# Patient Record
Sex: MALE | Race: OTHER | URBAN - METROPOLITAN AREA
[De-identification: names, ages, dates, MRNs, and addresses within clinical notes are randomized per-mention and may not be internally consistent; named-entity substitution may affect disease eponyms.]

---

## 2020-12-15 ENCOUNTER — EMERGENCY (EMERGENCY)
Facility: HOSPITAL | Age: 45
LOS: 0 days | Discharge: HOME | End: 2020-12-15
Attending: EMERGENCY MEDICINE | Admitting: EMERGENCY MEDICINE
Payer: MEDICAID

## 2020-12-15 VITALS
HEART RATE: 72 BPM | OXYGEN SATURATION: 100 % | SYSTOLIC BLOOD PRESSURE: 126 MMHG | RESPIRATION RATE: 18 BRPM | DIASTOLIC BLOOD PRESSURE: 76 MMHG | TEMPERATURE: 98 F

## 2020-12-15 DIAGNOSIS — Z87.19 PERSONAL HISTORY OF OTHER DISEASES OF THE DIGESTIVE SYSTEM: ICD-10-CM

## 2020-12-15 DIAGNOSIS — R10.13 EPIGASTRIC PAIN: ICD-10-CM

## 2020-12-15 DIAGNOSIS — K80.20 CALCULUS OF GALLBLADDER WITHOUT CHOLECYSTITIS WITHOUT OBSTRUCTION: ICD-10-CM

## 2020-12-15 LAB
ALBUMIN SERPL ELPH-MCNC: 4.6 G/DL — SIGNIFICANT CHANGE UP (ref 3.5–5.2)
ALP SERPL-CCNC: 102 U/L — SIGNIFICANT CHANGE UP (ref 30–115)
ALT FLD-CCNC: 32 U/L — SIGNIFICANT CHANGE UP (ref 0–41)
ANION GAP SERPL CALC-SCNC: 12 MMOL/L — SIGNIFICANT CHANGE UP (ref 7–14)
AST SERPL-CCNC: 19 U/L — SIGNIFICANT CHANGE UP (ref 0–41)
BASOPHILS # BLD AUTO: 0.03 K/UL — SIGNIFICANT CHANGE UP (ref 0–0.2)
BASOPHILS NFR BLD AUTO: 0.4 % — SIGNIFICANT CHANGE UP (ref 0–1)
BILIRUB SERPL-MCNC: 0.5 MG/DL — SIGNIFICANT CHANGE UP (ref 0.2–1.2)
BUN SERPL-MCNC: 20 MG/DL — SIGNIFICANT CHANGE UP (ref 10–20)
CALCIUM SERPL-MCNC: 9.3 MG/DL — SIGNIFICANT CHANGE UP (ref 8.5–10.1)
CHLORIDE SERPL-SCNC: 99 MMOL/L — SIGNIFICANT CHANGE UP (ref 98–110)
CO2 SERPL-SCNC: 24 MMOL/L — SIGNIFICANT CHANGE UP (ref 17–32)
CREAT SERPL-MCNC: 0.8 MG/DL — SIGNIFICANT CHANGE UP (ref 0.7–1.5)
EOSINOPHIL # BLD AUTO: 0.05 K/UL — SIGNIFICANT CHANGE UP (ref 0–0.7)
EOSINOPHIL NFR BLD AUTO: 0.6 % — SIGNIFICANT CHANGE UP (ref 0–8)
GLUCOSE SERPL-MCNC: 118 MG/DL — HIGH (ref 70–99)
HCT VFR BLD CALC: 45 % — SIGNIFICANT CHANGE UP (ref 42–52)
HGB BLD-MCNC: 15 G/DL — SIGNIFICANT CHANGE UP (ref 14–18)
IMM GRANULOCYTES NFR BLD AUTO: 0.4 % — HIGH (ref 0.1–0.3)
LACTATE SERPL-SCNC: 1.8 MMOL/L — SIGNIFICANT CHANGE UP (ref 0.7–2)
LIDOCAIN IGE QN: 35 U/L — SIGNIFICANT CHANGE UP (ref 7–60)
LYMPHOCYTES # BLD AUTO: 1.28 K/UL — SIGNIFICANT CHANGE UP (ref 1.2–3.4)
LYMPHOCYTES # BLD AUTO: 16 % — LOW (ref 20.5–51.1)
MCHC RBC-ENTMCNC: 30.4 PG — SIGNIFICANT CHANGE UP (ref 27–31)
MCHC RBC-ENTMCNC: 33.3 G/DL — SIGNIFICANT CHANGE UP (ref 32–37)
MCV RBC AUTO: 91.1 FL — SIGNIFICANT CHANGE UP (ref 80–94)
MONOCYTES # BLD AUTO: 0.27 K/UL — SIGNIFICANT CHANGE UP (ref 0.1–0.6)
MONOCYTES NFR BLD AUTO: 3.4 % — SIGNIFICANT CHANGE UP (ref 1.7–9.3)
NEUTROPHILS # BLD AUTO: 6.32 K/UL — SIGNIFICANT CHANGE UP (ref 1.4–6.5)
NEUTROPHILS NFR BLD AUTO: 79.2 % — HIGH (ref 42.2–75.2)
NRBC # BLD: 0 /100 WBCS — SIGNIFICANT CHANGE UP (ref 0–0)
PLATELET # BLD AUTO: 221 K/UL — SIGNIFICANT CHANGE UP (ref 130–400)
POTASSIUM SERPL-MCNC: 4.1 MMOL/L — SIGNIFICANT CHANGE UP (ref 3.5–5)
POTASSIUM SERPL-SCNC: 4.1 MMOL/L — SIGNIFICANT CHANGE UP (ref 3.5–5)
PROT SERPL-MCNC: 7.5 G/DL — SIGNIFICANT CHANGE UP (ref 6–8)
RBC # BLD: 4.94 M/UL — SIGNIFICANT CHANGE UP (ref 4.7–6.1)
RBC # FLD: 12.5 % — SIGNIFICANT CHANGE UP (ref 11.5–14.5)
SODIUM SERPL-SCNC: 135 MMOL/L — SIGNIFICANT CHANGE UP (ref 135–146)
TROPONIN T SERPL-MCNC: <0.01 NG/ML — SIGNIFICANT CHANGE UP
WBC # BLD: 7.98 K/UL — SIGNIFICANT CHANGE UP (ref 4.8–10.8)
WBC # FLD AUTO: 7.98 K/UL — SIGNIFICANT CHANGE UP (ref 4.8–10.8)

## 2020-12-15 PROCEDURE — 99283 EMERGENCY DEPT VISIT LOW MDM: CPT

## 2020-12-15 PROCEDURE — 76705 ECHO EXAM OF ABDOMEN: CPT | Mod: 26

## 2020-12-15 PROCEDURE — 93010 ELECTROCARDIOGRAM REPORT: CPT

## 2020-12-15 PROCEDURE — 99285 EMERGENCY DEPT VISIT HI MDM: CPT | Mod: 25

## 2020-12-15 PROCEDURE — 74177 CT ABD & PELVIS W/CONTRAST: CPT | Mod: 26

## 2020-12-15 PROCEDURE — 36000 PLACE NEEDLE IN VEIN: CPT

## 2020-12-15 RX ORDER — FAMOTIDINE 10 MG/ML
20 INJECTION INTRAVENOUS ONCE
Refills: 0 | Status: COMPLETED | OUTPATIENT
Start: 2020-12-15 | End: 2020-12-15

## 2020-12-15 RX ORDER — DIPHENHYDRAMINE HYDROCHLORIDE AND LIDOCAINE HYDROCHLORIDE AND ALUMINUM HYDROXIDE AND MAGNESIUM HYDRO
30 KIT ONCE
Refills: 0 | Status: COMPLETED | OUTPATIENT
Start: 2020-12-15 | End: 2020-12-15

## 2020-12-15 RX ORDER — SODIUM CHLORIDE 9 MG/ML
1000 INJECTION INTRAMUSCULAR; INTRAVENOUS; SUBCUTANEOUS ONCE
Refills: 0 | Status: COMPLETED | OUTPATIENT
Start: 2020-12-15 | End: 2020-12-15

## 2020-12-15 RX ADMIN — FAMOTIDINE 20 MILLIGRAM(S): 10 INJECTION INTRAVENOUS at 12:06

## 2020-12-15 RX ADMIN — DIPHENHYDRAMINE HYDROCHLORIDE AND LIDOCAINE HYDROCHLORIDE AND ALUMINUM HYDROXIDE AND MAGNESIUM HYDRO 30 MILLILITER(S): KIT at 12:06

## 2020-12-15 RX ADMIN — SODIUM CHLORIDE 1000 MILLILITER(S): 9 INJECTION INTRAMUSCULAR; INTRAVENOUS; SUBCUTANEOUS at 12:06

## 2020-12-15 NOTE — ED PROVIDER NOTE - OBJECTIVE STATEMENT
45y M w/ PMH of gastritis presenting with epigastric pain that started at 12am this morning. Occurred gradually. No alleviating/worsening symptoms. Denies fever, chills, cough, runny nose, sore throat, n/v/d, or numbness/tingling.

## 2020-12-15 NOTE — ED PROVIDER NOTE - ATTENDING CONTRIBUTION TO CARE
44 y/o male with hx of gallstones, c/o epigastric pain after eating. Moderate in intensity. Non-radiating. No aggravating or alleviating factors. No fever. No vomiting. No chest pain, no SOB.  O/E: Constitutional: Non-toxic in appearance. Eyes: No pallor, no jaundice. Neck: Neck supple, no meningeal signs. Respiratory: Lungs CTA and equal b/l. Cardio: S1 S2 regular, no murmur. ABD: ABD soft, + epigastric tenderness, no RUQ tenderness, negative Wolf's, no guarding, no rebound. Extremities: No pedal edema, no calf tenderness. Skin: No skin rash. Neuro: No neurologic deficits.   ED Bedside U/S --> + gallstones, no cholecystitis.  Imp: Ddx includes biliary colic, gastritis, pancreatitis.  A/P: Analgesics, check labs, lipase, will re-assess.

## 2020-12-15 NOTE — ED PROVIDER NOTE - PATIENT PORTAL LINK FT
You can access the FollowMyHealth Patient Portal offered by [facility] by registering at the following website: [email or web address]. By joining [email or web address]’s FollowMyHealth portal, you will also be able to view your health information using other applications (apps) compatible with our system. You can access the FollowMyHealth Patient Portal offered by [facility] by registering at the following website: [email or web address]. By joining [employer]’s FollowMyHealth portal, you will also be able to view your health information using other applications (apps) compatible with our system.

## 2020-12-15 NOTE — ED PROVIDER NOTE - CARE PROVIDER_API CALL
Morris Baptiste  GASTROENTEROLOGY  [address] [place] [address]  Phone: [phone]  Fax: [phone]  Follow Up Time: 1-3 Days   Morris Baptiste  GASTROENTEROLOGY  [address] [place] [address]  Phone: [phone]  Fax: [phone]  Follow Up Time: 1-3 Days    Rayshawn Frye  SURGERY  [address] [place] [address], [address] [place] [address]  Phone: [phone]  Fax: [phone]  Follow Up Time: 1-3 Days

## 2020-12-15 NOTE — ED PROCEDURE NOTE - ATTENDING CONTRIBUTION TO CARE
I was present for and supervised the key and critical aspects of the procedures performed during the care of the patient. This ends my involvement in the patients care.

## 2020-12-15 NOTE — ED PROVIDER NOTE - CLINICAL SUMMARY MEDICAL DECISION MAKING FREE TEXT BOX
Pt s/o to my by Dr. Yo - 46 y/o male in ER with c/o epigastric pain and N x 1 day.  Labs ok, lipase and lft's wnl.  RUQ sono - contracted GB, CT abd - questionable pericholecystic stranding.  pt seen and eval by surgery, pain much improved, per surgery, pt can be d/c'd to f/u with surg and GI as outpt  pt told to return to ER for worsening pain, fever, vomiting, or any other new/concerning symptoms. pt understands and agrees with plan.

## 2020-12-15 NOTE — ED PROVIDER NOTE - NSFOLLOWUPCLINICS_GEN_ALL_ED_FT
[facility]  Medicine  [address], [place]   Phone: [phone]  Fax:   Follow Up Time: 1-3 Days     [facility]  Medicine  [address] [place], [place]   Phone: [phone]  Fax:   Follow Up Time: 1-3 Days

## 2020-12-15 NOTE — CONSULT NOTE ADULT - ASSESSMENT
A/P:  FLORENCE VIGIL is a 45y year old Male presenting to the ED for abdominal pain. Hx of gallstones. RUQ us showing gallstones within a contracted gallbladder. No LFT derangements. Upon exam in ED patient states relief of abdominal pain.       Plan:  No acute surgical intervention  Patient with biliary colic, resolved  May be discharged with outpatient followup      Plan discussed with Dr. Frye    Thank you for this consultation and allowing us to participate in your patient's care.

## 2020-12-15 NOTE — ED PROVIDER NOTE - PHYSICAL EXAMINATION
CONSTITUTIONAL: Well-developed; well-nourished; in no acute distress.   SKIN: warm, dry  HEAD: Normocephalic; atraumatic.  EYES: PERRL, EOMI, no conjunctival erythema  ENT: No nasal discharge; airway clear.  NECK: Supple; non tender.  CARD: S1, S2 normal; no murmurs, gallops, or rubs. Regular rate and rhythm.   RESP: No wheezes, rales or rhonchi.  ABD: soft mild tenderness to palpation of the epigastric region.   EXT: Normal ROM.  No clubbing, cyanosis or edema.   LYMPH: No acute cervical adenopathy.  NEURO: Alert, oriented, grossly unremarkable  PSYCH: Cooperative, appropriate.

## 2020-12-15 NOTE — ED PROVIDER NOTE - PROGRESS NOTE DETAILS
Pt cont to complain of abd pain. States med did not relieve symptoms. Repeat exam ttp to epigastric region. Will obtain CT and reassess. s/o Dr. Talavera Pt feels much improved. States abd pain resolved. No repeat tenderness. Discussed with Dr. Berger, surg resident. Will evaluate pt. Discussed with Dr. Berger, surg resident. Recommends outpatient f/u.

## 2020-12-15 NOTE — ED PROVIDER NOTE - CARE PROVIDERS DIRECT ADDRESSES
[email or web address]

## 2020-12-15 NOTE — CONSULT NOTE ADULT - SUBJECTIVE AND OBJECTIVE BOX
FLORENCE VIGIL [id number]  45y Male  HPI: 46 yo male with past medical hx of gastritis, no surgical history, presents to the ED for 1d of abdomina pain. His pain started around midnight last night, described as sharp, located at his epigastrium, radiating to his RUQ. He ate fried pork 2 hours prior to onset of symptoms. He states similar pain in the past, approximately 4 years ago when he was diagnosed with gallstones. He follows with GI in [place] ( Dr. Clare Hu) for gastritis, last scoped 1 month ago. EGD showing gastritis, C-scope showing diverticulosis. Denies fevers, chills, chest pain, palpitations, nausea, vomiting, diarrhea, constipation.     PAST MEDICAL & SURGICAL HISTORY:  Gastritis      MEDICATIONS  (STANDING):  None     Allergies  No Known Allergies        REVIEW OF SYSTEMS  [x] A ten-point review of systems was otherwise negative except as noted.  [ ] Due to altered mental status/intubation, subjective information were not able to be obtained from the patient. History was obtained, to the extent possible, from review of the chart and collateral sources of information.      Vital Signs Last 24 Hrs  T(C): 36.7 (15 Dec 2020 11:05), Max: 36.7 (15 Dec 2020 11:05)  T(F): 98 (15 Dec 2020 11:05), Max: 98 (15 Dec 2020 11:05)  HR: 72 (15 Dec 2020 11:05) (72 - 72)  BP: 126/76 (15 Dec 2020 11:05) (126/76 - 126/76)  BP(mean): --  RR: 18 (15 Dec 2020 11:05) (18 - 18)  SpO2: 100% (15 Dec 2020 11:05) (100% - 100%)    PHYSICAL EXAM:  GENERAL: NAD, well-appearing  CHEST/LUNG: Clear to auscultation bilaterally  HEART: Regular rate and rhythm  ABDOMEN: Soft, mild epigastric tenderness, no RUQ tenderness, negative wolf sign, no guarding or rebound  EXTREMITIES:  No clubbing, cyanosis, or edema      LABS:  Labs:  CAPILLARY BLOOD GLUCOSE                        15.0   7.98  )-----------( 221      ( 15 Dec 2020 11:45 )             45.0       Auto Neutrophil %: 79.2 % (12-15-20 @ 11:45)  Auto Immature Granulocyte %: 0.4 % (12-15-20 @ 11:45)    12-15    135  |  99  |  20  ----------------------------<  118<H>  4.1   |  24  |  0.8      Calcium, Total Serum: 9.3 mg/dL (12-15-20 @ 11:45)      LFTs:             7.5  | 0.5  | 19       ------------------[102     ( 15 Dec 2020 11:45 )  4.6  | x    | 32          Lipase:35     Amylase:x         Lactate, Blood: 1.8 mmol/L (12-15-20 @ 11:45)      Coags:    CARDIAC MARKERS ( 15 Dec 2020 11:45 )  x     / <0.01 ng/mL / x     / x     / x              RADIOLOGY & ADDITIONAL STUDIES:  < from: US Abdomen Limited (12.15.20 @ 14:35) >    Liver: Within normal limits.  Bile ducts: Normal caliber. Common bile duct measures 4 mm.  Gallbladder: Contracted gallbladder with gallstones. Negative sonographic Wolf's sign.  Pancreas: Obscured by overlying bowel gas.  Right kidney: 10.2 cm. No hydronephrosis.  Ascites: None.  IVC: Visualized portions are within normal limits.    IMPRESSION:    Contracted gallbladder with gallstones.      < end of copied text >  < from: CT Abdomen and Pelvis w/ IV Cont (12.15.20 @ 15:57) >  IMPRESSION:  Questionable gallbladder pericholecystic fat stranding. Consider further evaluation with right upper quadrant abdominal ultrasound.      < end of copied text >

## 2020-12-15 NOTE — CONSULT NOTE ADULT - ATTENDING COMMENTS
I examined the patietn and discussed my plan with the resident  the patient has biliary colic and no evidence for acute cholecystitis.  he can go home and f/u in office to discuss elective surgery

## 2020-12-15 NOTE — ED PROVIDER NOTE - CARE PLAN
Principal Discharge DX:	Abdominal pain   Principal Discharge DX:	Abdominal pain  Secondary Diagnosis:	Gallstones

## 2020-12-29 PROBLEM — Z00.00 ENCOUNTER FOR PREVENTIVE HEALTH EXAMINATION: Status: ACTIVE | Noted: 2020-12-29

## 2021-05-26 ENCOUNTER — EMERGENCY (EMERGENCY)
Facility: HOSPITAL | Age: 46
LOS: 0 days | Discharge: HOME | End: 2021-05-26
Attending: STUDENT IN AN ORGANIZED HEALTH CARE EDUCATION/TRAINING PROGRAM | Admitting: STUDENT IN AN ORGANIZED HEALTH CARE EDUCATION/TRAINING PROGRAM
Payer: MEDICAID

## 2021-05-26 VITALS
OXYGEN SATURATION: 98 % | HEART RATE: 62 BPM | WEIGHT: 169.98 LBS | RESPIRATION RATE: 18 BRPM | TEMPERATURE: 96 F | DIASTOLIC BLOOD PRESSURE: 72 MMHG | SYSTOLIC BLOOD PRESSURE: 155 MMHG

## 2021-05-26 VITALS
OXYGEN SATURATION: 99 % | RESPIRATION RATE: 17 BRPM | HEART RATE: 60 BPM | TEMPERATURE: 97 F | DIASTOLIC BLOOD PRESSURE: 67 MMHG | SYSTOLIC BLOOD PRESSURE: 141 MMHG

## 2021-05-26 DIAGNOSIS — K82.4 CHOLESTEROLOSIS OF GALLBLADDER: ICD-10-CM

## 2021-05-26 DIAGNOSIS — R10.9 UNSPECIFIED ABDOMINAL PAIN: ICD-10-CM

## 2021-05-26 DIAGNOSIS — K80.20 CALCULUS OF GALLBLADDER WITHOUT CHOLECYSTITIS WITHOUT OBSTRUCTION: ICD-10-CM

## 2021-05-26 LAB
ALBUMIN SERPL ELPH-MCNC: 4.5 G/DL — SIGNIFICANT CHANGE UP (ref 3.5–5.2)
ALP SERPL-CCNC: 95 U/L — SIGNIFICANT CHANGE UP (ref 30–115)
ALT FLD-CCNC: 23 U/L — SIGNIFICANT CHANGE UP (ref 0–41)
ANION GAP SERPL CALC-SCNC: 9 MMOL/L — SIGNIFICANT CHANGE UP (ref 7–14)
APPEARANCE UR: CLEAR — SIGNIFICANT CHANGE UP
AST SERPL-CCNC: 18 U/L — SIGNIFICANT CHANGE UP (ref 0–41)
BASOPHILS # BLD AUTO: 0.03 K/UL — SIGNIFICANT CHANGE UP (ref 0–0.2)
BASOPHILS NFR BLD AUTO: 0.6 % — SIGNIFICANT CHANGE UP (ref 0–1)
BILIRUB DIRECT SERPL-MCNC: <0.2 MG/DL — SIGNIFICANT CHANGE UP (ref 0–0.2)
BILIRUB INDIRECT FLD-MCNC: >0.3 MG/DL — SIGNIFICANT CHANGE UP (ref 0.2–1.2)
BILIRUB SERPL-MCNC: 0.5 MG/DL — SIGNIFICANT CHANGE UP (ref 0.2–1.2)
BILIRUB UR-MCNC: NEGATIVE — SIGNIFICANT CHANGE UP
BUN SERPL-MCNC: 20 MG/DL — SIGNIFICANT CHANGE UP (ref 10–20)
CALCIUM SERPL-MCNC: 9 MG/DL — SIGNIFICANT CHANGE UP (ref 8.5–10.1)
CHLORIDE SERPL-SCNC: 104 MMOL/L — SIGNIFICANT CHANGE UP (ref 98–110)
CO2 SERPL-SCNC: 25 MMOL/L — SIGNIFICANT CHANGE UP (ref 17–32)
COLOR SPEC: SIGNIFICANT CHANGE UP
CREAT SERPL-MCNC: 0.8 MG/DL — SIGNIFICANT CHANGE UP (ref 0.7–1.5)
DIFF PNL FLD: NEGATIVE — SIGNIFICANT CHANGE UP
EOSINOPHIL # BLD AUTO: 0.2 K/UL — SIGNIFICANT CHANGE UP (ref 0–0.7)
EOSINOPHIL NFR BLD AUTO: 4 % — SIGNIFICANT CHANGE UP (ref 0–8)
GLUCOSE SERPL-MCNC: 118 MG/DL — HIGH (ref 70–99)
GLUCOSE UR QL: NEGATIVE — SIGNIFICANT CHANGE UP
HCT VFR BLD CALC: 40.8 % — LOW (ref 42–52)
HGB BLD-MCNC: 14 G/DL — SIGNIFICANT CHANGE UP (ref 14–18)
IMM GRANULOCYTES NFR BLD AUTO: 0.2 % — SIGNIFICANT CHANGE UP (ref 0.1–0.3)
KETONES UR-MCNC: NEGATIVE — SIGNIFICANT CHANGE UP
LACTATE SERPL-SCNC: 1.6 MMOL/L — SIGNIFICANT CHANGE UP (ref 0.7–2)
LEUKOCYTE ESTERASE UR-ACNC: NEGATIVE — SIGNIFICANT CHANGE UP
LIDOCAIN IGE QN: 25 U/L — SIGNIFICANT CHANGE UP (ref 7–60)
LYMPHOCYTES # BLD AUTO: 1.56 K/UL — SIGNIFICANT CHANGE UP (ref 1.2–3.4)
LYMPHOCYTES # BLD AUTO: 30.8 % — SIGNIFICANT CHANGE UP (ref 20.5–51.1)
MCHC RBC-ENTMCNC: 31.2 PG — HIGH (ref 27–31)
MCHC RBC-ENTMCNC: 34.3 G/DL — SIGNIFICANT CHANGE UP (ref 32–37)
MCV RBC AUTO: 90.9 FL — SIGNIFICANT CHANGE UP (ref 80–94)
MONOCYTES # BLD AUTO: 0.34 K/UL — SIGNIFICANT CHANGE UP (ref 0.1–0.6)
MONOCYTES NFR BLD AUTO: 6.7 % — SIGNIFICANT CHANGE UP (ref 1.7–9.3)
NEUTROPHILS # BLD AUTO: 2.92 K/UL — SIGNIFICANT CHANGE UP (ref 1.4–6.5)
NEUTROPHILS NFR BLD AUTO: 57.7 % — SIGNIFICANT CHANGE UP (ref 42.2–75.2)
NITRITE UR-MCNC: NEGATIVE — SIGNIFICANT CHANGE UP
NRBC # BLD: 0 /100 WBCS — SIGNIFICANT CHANGE UP (ref 0–0)
PH UR: 7.5 — SIGNIFICANT CHANGE UP (ref 5–8)
PLATELET # BLD AUTO: 195 K/UL — SIGNIFICANT CHANGE UP (ref 130–400)
POTASSIUM SERPL-MCNC: 4.1 MMOL/L — SIGNIFICANT CHANGE UP (ref 3.5–5)
POTASSIUM SERPL-SCNC: 4.1 MMOL/L — SIGNIFICANT CHANGE UP (ref 3.5–5)
PROT SERPL-MCNC: 6.9 G/DL — SIGNIFICANT CHANGE UP (ref 6–8)
PROT UR-MCNC: NEGATIVE — SIGNIFICANT CHANGE UP
RBC # BLD: 4.49 M/UL — LOW (ref 4.7–6.1)
RBC # FLD: 12.1 % — SIGNIFICANT CHANGE UP (ref 11.5–14.5)
SODIUM SERPL-SCNC: 138 MMOL/L — SIGNIFICANT CHANGE UP (ref 135–146)
SP GR SPEC: 1.04 — HIGH (ref 1.01–1.03)
UROBILINOGEN FLD QL: SIGNIFICANT CHANGE UP
WBC # BLD: 5.06 K/UL — SIGNIFICANT CHANGE UP (ref 4.8–10.8)
WBC # FLD AUTO: 5.06 K/UL — SIGNIFICANT CHANGE UP (ref 4.8–10.8)

## 2021-05-26 PROCEDURE — 76705 ECHO EXAM OF ABDOMEN: CPT | Mod: 26

## 2021-05-26 PROCEDURE — 74177 CT ABD & PELVIS W/CONTRAST: CPT | Mod: 26,MA

## 2021-05-26 PROCEDURE — 99285 EMERGENCY DEPT VISIT HI MDM: CPT

## 2021-05-26 RX ORDER — KETOROLAC TROMETHAMINE 30 MG/ML
15 SYRINGE (ML) INJECTION ONCE
Refills: 0 | Status: DISCONTINUED | OUTPATIENT
Start: 2021-05-26 | End: 2021-05-26

## 2021-05-26 RX ORDER — SODIUM CHLORIDE 9 MG/ML
1000 INJECTION, SOLUTION INTRAVENOUS ONCE
Refills: 0 | Status: COMPLETED | OUTPATIENT
Start: 2021-05-26 | End: 2021-05-26

## 2021-05-26 RX ORDER — METOCLOPRAMIDE HCL 10 MG
10 TABLET ORAL ONCE
Refills: 0 | Status: COMPLETED | OUTPATIENT
Start: 2021-05-26 | End: 2021-05-26

## 2021-05-26 RX ADMIN — Medication 15 MILLIGRAM(S): at 08:37

## 2021-05-26 RX ADMIN — SODIUM CHLORIDE 1000 MILLILITER(S): 9 INJECTION, SOLUTION INTRAVENOUS at 08:10

## 2021-05-26 RX ADMIN — Medication 10 MILLIGRAM(S): at 10:20

## 2021-05-26 NOTE — ED ADULT NURSE NOTE - OBJECTIVE STATEMENT
Pt c/o right sided flank pain since last night radiating to RLQ. Pt has hx of gallstones in 12/2020, never followed up outpatient with surgeon. Denies fevers, chills, cough, cp, sob, n/v/d, urinary symptoms.

## 2021-05-26 NOTE — ED ADULT NURSE NOTE - CHIEF COMPLAINT QUOTE
Patient with hx kidney stones c/o right flank pain/ generalized abdominal pain since last night. Denies Dysuria and blood in urine

## 2021-05-26 NOTE — ED PROVIDER NOTE - PATIENT PORTAL LINK FT
You can access the FollowMyHealth Patient Portal offered by [facility] by registering at the following website: [email or web address]. By joining [employer]’s FollowMyHealth portal, you will also be able to view your health information using other applications (apps) compatible with our system.

## 2021-05-26 NOTE — ED ADULT TRIAGE NOTE - CHIEF COMPLAINT QUOTE
Patient c/o right lower back pain since last night Denies any recent trauma or heavy lifting Patient with hx kidney stones c/o right flank pain/ generalized abdominal pain since last night. Denies Dysuria and blood in urine

## 2021-05-26 NOTE — ED PROVIDER NOTE - CLINICAL SUMMARY MEDICAL DECISION MAKING FREE TEXT BOX
46M with pmh gallstones evaluated in the ED 12/15/21, seen by Dr. Frye, did not f/u outpatient who presents with R flank pain similar to prior associated with eating. Labs wnl, imaging reviewed. Patient has adenomyomatosis, benign polyp of gallbladder and gallstones. Pt given analgesia and reports improvement of Sx. Pt told to f/u with surgery for outpatient lap henry as desired. I have fully discussed the medical management and delivery of care with the patient. I have discussed any available labs, imaging and treatment options with the patient. All Questions answered at the bedside and printed copies of all results provided and recommended to review with PCP. Patient confirms understanding and has been given detailed return precautions. Patient instructed to return to the ED should symptoms persist or worsen. Patient has demonstrated capacity and has verbalized understanding. Patient is well appearing upon discharge, ambulatory with a steady gait.

## 2021-05-26 NOTE — ED PROVIDER NOTE - NSFOLLOWUPINSTRUCTIONS_ED_ALL_ED_FT
Colelitiasis    La colelitiasis es kim forma de enfermedad de la vesícula biliar en la que se oriana cálculos biliares en la vesícula biliar. La vesícula biliar es un órgano que almacena bilis. La bilis se produce en el hígado y ayuda a digerir las grasas. Los cálculos biliares comienzan mary pequeños kia y lentamente se convierten en piedras. Es posible que no causen síntomas hasta que la vesícula biliar se apriete (contraiga) y un cálculo biliar esté bloqueando el conducto (ataque de la vesícula biliar), lo que puede causar dolor. La colelitiasis también se conoce mary cálculos biliares.    Cholelithiasis    Cholelithiasis is a form of gallbladder disease in which gallstones form in the gallbladder. The gallbladder is an organ that stores bile. Bile is made in the liver, and it helps to digest fats. Gallstones begin as small crystals and slowly grow into stones. They may cause no symptoms until the gallbladder tightens (contracts) and a gallstone is blocking the duct (gallbladder attack), which can cause pain. Cholelithiasis is also referred to as gallstones.    ImageThere are two main types of gallstones:    Cholesterol stones. These are made of hardened cholesterol and are usually yellow-green in color. They are the most common type of gallstone. Cholesterol is a white, waxy, fat-like substance that is made in the liver.  Pigment stones. These are dark in color and are made of a red-yellow substance that forms when hemoglobin from red blood cells breaks down (bilirubin).    What are the causes?  This condition may be caused by an imbalance in the substances that bile is made of. This can happen if the bile:    Has too much bilirubin.  Has too much cholesterol.  Does not have enough bile salts. These salts help the body absorb and digest fats.    In some cases, this condition can also be caused by the gallbladder not emptying completely or often enough.    What increases the risk?  The following factors may make you more likely to develop this condition:    Being female.  Having multiple pregnancies. Health care providers sometimes advise removing diseased gallbladders before future pregnancies.  Eating a diet that is heavy in fried foods, fat, and refined carbohydrates, like white bread and white rice.  Being obese.  Being older than age 40.  Prolonged use of medicines that contain female hormones (estrogen).  Having diabetes mellitus.  Rapidly losing weight.  Having a family history of gallstones.  Being of  or [place] descent.  Having an intestinal disease such as Crohn disease.  Having metabolic syndrome.  Having cirrhosis.  Having severe types of anemia such as sickle cell anemia.    What are the signs or symptoms?  In most cases, there are no symptoms. These are known as silent gallstones. If a gallstone blocks the bile ducts, it can cause a gallbladder attack. The main symptom of a gallbladder attack is sudden pain in the upper right abdomen. The pain usually comes at night or after eating a large meal. The pain can last for one or several hours and can spread to the right shoulder or chest.    If the bile duct is blocked for more than a few hours, it can cause infection or inflammation of the gallbladder, liver, or pancreas, which may cause:    Nausea.  Vomiting.  Abdominal pain that lasts for 5 hours or more.  Fever or chills.  Yellowing of the skin or the whites of the eyes (jaundice).  Dark urine.  Light-colored stools.    How is this diagnosed?  This condition may be diagnosed based on:    A physical exam.  Your medical history.  An ultrasound of your gallbladder.  CT scan.  MRI.  Blood tests to check for signs of infection or inflammation.  A scan of your gallbladder and bile ducts (biliary system) using nonharmful radioactive material and special cameras that can see the radioactive material (cholescintigram). This test checks to see how your gallbladder contracts and whether bile ducts are blocked.  Inserting a small tube with a camera on the end (endoscope) through your mouth to inspect bile ducts and check for blockages (endoscopic retrograde cholangiopancreatogram).    How is this treated?  Treatment for gallstones depends on the severity of the condition. Silent gallstones do not need treatment. If the gallstones cause a gallbladder attack or other symptoms, treatment may be required. Options for treatment include:    Surgery to remove the gallbladder (cholecystectomy). This is the most common treatment.  Medicines to dissolve gallstones. These are most effective at treating small gallstones. You may need to take medicines for up to 6–12 months.  Shock wave treatment (extracorporeal biliary lithotripsy). In this treatment, an ultrasound machine sends shock waves to the gallbladder to break gallstones into smaller pieces. These pieces can then be passed into the intestines or be dissolved by medicine. This is rarely used.  Removing gallstones through endoscopic retrograde cholangiopancreatogram. A small basket can be attached to the endoscope and used to capture and remove gallstones.    Follow these instructions at home:  Take over-the-counter and prescription medicines only as told by your health care provider.  Maintain a healthy weight and follow a healthy diet. This includes:    Reducing fatty foods, such as fried food.  Reducing refined carbohydrates, like white bread and white rice.  Increasing fiber. Aim for foods like almonds, fruit, and beans.    Keep all follow-up visits as told by your health care provider. This is important.  Contact a health care provider if:  You think you have had a gallbladder attack.  You have been diagnosed with silent gallstones and you develop abdominal pain or indigestion.  Get help right away if:  You have pain from a gallbladder attack that lasts for more than 2 hours.  You have abdominal pain that lasts for more than 5 hours.  You have a fever or chills.  You have persistent nausea and vomiting.  You develop jaundice.  You have dark urine or light-colored stools.  Summary  Cholelithiasis (also called gallstones) is a form of gallbladder disease in which gallstones form in the gallbladder.  This condition is caused by an imbalance in the substances that make up bile. This can happen if the bile has too much cholesterol, too much bilirubin, or not enough bile salts.  You are more likely to develop this condition if you are female, pregnant, using medicines with estrogen, obese, older than age 40, or have a family history of gallstones. You may also develop gallstones if you have diabetes, an intestinal disease, cirrhosis, or metabolic syndrome.  Treatment for gallstones depends on the severity of the condition. Silent gallstones do not need treatment.  If gallstones cause a gallbladder attack or other symptoms, treatment may be needed. The most common treatment is surgery to remove the gallbladder.  This information is not intended to replace advice given to you by your health care provider. Make sure you discuss any questions you have with your health care provider.      Adenomyomatosis — Adenomyomatosis is an abnormality of the gallbladder characterized by overgrowth of the mucosa, thickening of the muscle wall, and intramural diverticula. The prevalence of adenomyomatosis of the gallbladder is low but appears to have a higher prevalence in women than in men. In one report, for example, only 103 cases of adenomyomatosis were found in over 10,000 cholecystectomies (1 percent) [12] and in 61 patients (2.7 percent) of a total of 2290 cholecystectomy patients who had polyps diagnosed on ultrasound [10].    ?Diffuse adenomyomatosis causes thickening and irregularity of the mucosal surface and the muscle coat, leading to cystic-like structures in the gallbladder wall or polypoid projections from the mucosa of the gallbladder. In the early phases, the intramural extension of the epithelium creates tubules and crypts in the lamina propria that accumulate mucous. Fluid-filled mucosal pockets eventually herniate into the wall of the gallbladder and through the muscularis propria, forming cystic structures that are visible on gross inspection as pools of bile in the gallbladder wall (Rokitansky-Aschoff sinuses). The point of herniation may appear sealed due to hypertrophy of the muscularis.

## 2021-05-26 NOTE — ED PROVIDER NOTE - PHYSICAL EXAMINATION
CONSTITUTIONAL: Well-developed; well-nourished; in no acute distress, nontoxic appearing  SKIN: skin exam is warm and dry  HEAD: Normocephalic; atraumatic  ENT: MMM  NECK: ROM intact.  CARD: S1, S2 normal, no murmur  RESP: No wheezes, rales or rhonchi. Good air movement bilaterally  ABD: soft; non-distended; non-tender. No Rebound, No guarding. No CVAT  EXT: Normal ROM.   NEURO: awake, alert, following commands, oriented, grossly unremarkable. No Focal deficits. GCS 15.   PSYCH: Cooperative, appropriate.

## 2021-05-26 NOTE — ED PROVIDER NOTE - OBJECTIVE STATEMENT
46 year old male, no past medical history, who presents with right sided flank pain. patient reports intermittent episodes of dull, non-radiating right sided flank pain that worsened last night, now has become constant, radiating to rlq prompting presentation to ed. patient with hx presentation 12/2020, found to have gallstones. denies f/c, chest pain, SOB, nausea/vomiting/diarrhea, urinary symptoms, bowel changes.

## 2021-05-26 NOTE — ED PROVIDER NOTE - CARE PROVIDER_API CALL
Rayshawn Frye)  Surgery  [address] [place] [address], [address] [place] [address]  Phone: [phone]  Fax: [phone]  Established Patient  Follow Up Time: 1-3 Days

## 2021-05-26 NOTE — ED ADULT NURSE NOTE - NSIMPLEMENTINTERV_GEN_ALL_ED
Implemented All Universal Safety Interventions:  [place] to call system. Call bell, personal items and telephone within reach. Instruct patient to call for assistance. Room bathroom lighting operational. Non-slip footwear when patient is off stretcher. Physically safe environment: no spills, clutter or unnecessary equipment. Stretcher in lowest position, wheels locked, appropriate side rails in place.

## 2021-05-26 NOTE — ED PROVIDER NOTE - ATTENDING CONTRIBUTION TO CARE
46M with pmh gallstones evaluated in the ED 12/15/21, seen by Dr. Frye, not judged 46M with pmh gallstones evaluated in the ED 12/15/21, seen by Dr. Frye, did not f/u outpatient who presents with R flank pain similar to prior associated with eating. Denies n/v/d/f/c, bloody or melanotic stool.     Gen - NAD, Head - NCAT, Pharynx - clear, MMM, Heart - RRR, no m/g/r, Lungs - CTAB, no w/c/r, Abdomen - soft, NT, ND, Skin - No rash, Extremities - FROM, no edema, erythema, ecchymosis, brisk cap refill, Neuro - A&O x3, equal strength and sensation, non-focal exam    a/p: will eval for cholelithiasis vs cholecystitis, uretolithiasis, obtain labs, ua, ct a/p, ruq us, treat pain and reassess

## 2021-05-26 NOTE — ED PROVIDER NOTE - NS ED ROS FT
Review of Systems:  	•	CONSTITUTIONAL: no fever, no diaphoresis, no chills  	•	SKIN: no rash  	•	ENT: no sore throat  	•	RESPIRATORY: no shortness of breath, no cough  	•	CARDIAC: no chest pain, no palpitations  	•	GI: +R flank pain. no nausea, no vomiting, no diarrhea, no constipation  	•	GENITO-URINARY: no discharge, no dysuria; no hematuria, no increased urinary frequency  	•	MUSCULOSKELETAL: no joint paint, no swelling, no redness  	•	NEUROLOGIC: no weakness, no headache  	•	PSYCH: no anxiety, non suicidal, non homicidal, no hallucination, no depression

## 2021-05-27 LAB
CULTURE RESULTS: SIGNIFICANT CHANGE UP
SPECIMEN SOURCE: SIGNIFICANT CHANGE UP

## 2022-01-01 ENCOUNTER — EMERGENCY (EMERGENCY)
Facility: HOSPITAL | Age: 47
LOS: 0 days | Discharge: HOME | End: 2022-01-01
Attending: STUDENT IN AN ORGANIZED HEALTH CARE EDUCATION/TRAINING PROGRAM | Admitting: STUDENT IN AN ORGANIZED HEALTH CARE EDUCATION/TRAINING PROGRAM
Payer: MEDICAID

## 2022-01-01 VITALS
WEIGHT: 175.05 LBS | DIASTOLIC BLOOD PRESSURE: 74 MMHG | HEART RATE: 68 BPM | OXYGEN SATURATION: 100 % | RESPIRATION RATE: 18 BRPM | SYSTOLIC BLOOD PRESSURE: 148 MMHG | TEMPERATURE: 99 F

## 2022-01-01 DIAGNOSIS — R10.13 EPIGASTRIC PAIN: ICD-10-CM

## 2022-01-01 DIAGNOSIS — K21.9 GASTRO-ESOPHAGEAL REFLUX DISEASE WITHOUT ESOPHAGITIS: ICD-10-CM

## 2022-01-01 DIAGNOSIS — R10.11 RIGHT UPPER QUADRANT PAIN: ICD-10-CM

## 2022-01-01 DIAGNOSIS — K76.0 FATTY (CHANGE OF) LIVER, NOT ELSEWHERE CLASSIFIED: ICD-10-CM

## 2022-01-01 DIAGNOSIS — Z87.19 PERSONAL HISTORY OF OTHER DISEASES OF THE DIGESTIVE SYSTEM: ICD-10-CM

## 2022-01-01 LAB
ALBUMIN SERPL ELPH-MCNC: 4.5 G/DL — SIGNIFICANT CHANGE UP (ref 3.5–5.2)
ALP SERPL-CCNC: 108 U/L — SIGNIFICANT CHANGE UP (ref 30–115)
ALT FLD-CCNC: 31 U/L — SIGNIFICANT CHANGE UP (ref 0–41)
ANION GAP SERPL CALC-SCNC: 17 MMOL/L — HIGH (ref 7–14)
AST SERPL-CCNC: 22 U/L — SIGNIFICANT CHANGE UP (ref 0–41)
BASOPHILS # BLD AUTO: 0.03 K/UL — SIGNIFICANT CHANGE UP (ref 0–0.2)
BASOPHILS NFR BLD AUTO: 0.4 % — SIGNIFICANT CHANGE UP (ref 0–1)
BILIRUB SERPL-MCNC: 0.5 MG/DL — SIGNIFICANT CHANGE UP (ref 0.2–1.2)
BUN SERPL-MCNC: 14 MG/DL — SIGNIFICANT CHANGE UP (ref 10–20)
CALCIUM SERPL-MCNC: 9.3 MG/DL — SIGNIFICANT CHANGE UP (ref 8.5–10.1)
CHLORIDE SERPL-SCNC: 102 MMOL/L — SIGNIFICANT CHANGE UP (ref 98–110)
CO2 SERPL-SCNC: 19 MMOL/L — SIGNIFICANT CHANGE UP (ref 17–32)
CREAT SERPL-MCNC: 0.7 MG/DL — SIGNIFICANT CHANGE UP (ref 0.7–1.5)
EOSINOPHIL # BLD AUTO: 0.1 K/UL — SIGNIFICANT CHANGE UP (ref 0–0.7)
EOSINOPHIL NFR BLD AUTO: 1.4 % — SIGNIFICANT CHANGE UP (ref 0–8)
GLUCOSE SERPL-MCNC: 122 MG/DL — HIGH (ref 70–99)
HCT VFR BLD CALC: 42 % — SIGNIFICANT CHANGE UP (ref 42–52)
HGB BLD-MCNC: 14.8 G/DL — SIGNIFICANT CHANGE UP (ref 14–18)
IMM GRANULOCYTES NFR BLD AUTO: 0.4 % — HIGH (ref 0.1–0.3)
LACTATE SERPL-SCNC: 2.5 MMOL/L — HIGH (ref 0.7–2)
LIDOCAIN IGE QN: 31 U/L — SIGNIFICANT CHANGE UP (ref 7–60)
LYMPHOCYTES # BLD AUTO: 1.31 K/UL — SIGNIFICANT CHANGE UP (ref 1.2–3.4)
LYMPHOCYTES # BLD AUTO: 18.6 % — LOW (ref 20.5–51.1)
MCHC RBC-ENTMCNC: 31.6 PG — HIGH (ref 27–31)
MCHC RBC-ENTMCNC: 35.2 G/DL — SIGNIFICANT CHANGE UP (ref 32–37)
MCV RBC AUTO: 89.6 FL — SIGNIFICANT CHANGE UP (ref 80–94)
MONOCYTES # BLD AUTO: 0.34 K/UL — SIGNIFICANT CHANGE UP (ref 0.1–0.6)
MONOCYTES NFR BLD AUTO: 4.8 % — SIGNIFICANT CHANGE UP (ref 1.7–9.3)
NEUTROPHILS # BLD AUTO: 5.23 K/UL — SIGNIFICANT CHANGE UP (ref 1.4–6.5)
NEUTROPHILS NFR BLD AUTO: 74.4 % — SIGNIFICANT CHANGE UP (ref 42.2–75.2)
NRBC # BLD: 0 /100 WBCS — SIGNIFICANT CHANGE UP (ref 0–0)
PLATELET # BLD AUTO: 208 K/UL — SIGNIFICANT CHANGE UP (ref 130–400)
POTASSIUM SERPL-MCNC: 4.4 MMOL/L — SIGNIFICANT CHANGE UP (ref 3.5–5)
POTASSIUM SERPL-SCNC: 4.4 MMOL/L — SIGNIFICANT CHANGE UP (ref 3.5–5)
PROT SERPL-MCNC: 7.3 G/DL — SIGNIFICANT CHANGE UP (ref 6–8)
RBC # BLD: 4.69 M/UL — LOW (ref 4.7–6.1)
RBC # FLD: 12 % — SIGNIFICANT CHANGE UP (ref 11.5–14.5)
SODIUM SERPL-SCNC: 138 MMOL/L — SIGNIFICANT CHANGE UP (ref 135–146)
TROPONIN T SERPL-MCNC: <0.01 NG/ML — SIGNIFICANT CHANGE UP
WBC # BLD: 7.04 K/UL — SIGNIFICANT CHANGE UP (ref 4.8–10.8)
WBC # FLD AUTO: 7.04 K/UL — SIGNIFICANT CHANGE UP (ref 4.8–10.8)

## 2022-01-01 PROCEDURE — 71045 X-RAY EXAM CHEST 1 VIEW: CPT | Mod: 26

## 2022-01-01 PROCEDURE — 99285 EMERGENCY DEPT VISIT HI MDM: CPT | Mod: 25

## 2022-01-01 PROCEDURE — 76775 US EXAM ABDO BACK WALL LIM: CPT | Mod: 26

## 2022-01-01 PROCEDURE — 76705 ECHO EXAM OF ABDOMEN: CPT | Mod: 26

## 2022-01-01 RX ORDER — ACETAMINOPHEN 500 MG
650 TABLET ORAL ONCE
Refills: 0 | Status: COMPLETED | OUTPATIENT
Start: 2022-01-01 | End: 2022-01-01

## 2022-01-01 RX ORDER — FAMOTIDINE 10 MG/ML
20 INJECTION INTRAVENOUS ONCE
Refills: 0 | Status: COMPLETED | OUTPATIENT
Start: 2022-01-01 | End: 2022-01-01

## 2022-01-01 RX ORDER — SUCRALFATE 1 G
1 TABLET ORAL ONCE
Refills: 0 | Status: COMPLETED | OUTPATIENT
Start: 2022-01-01 | End: 2022-01-01

## 2022-01-01 RX ORDER — MORPHINE SULFATE 50 MG/1
4 CAPSULE, EXTENDED RELEASE ORAL ONCE
Refills: 0 | Status: DISCONTINUED | OUTPATIENT
Start: 2022-01-01 | End: 2022-01-01

## 2022-01-01 RX ORDER — SODIUM CHLORIDE 9 MG/ML
1000 INJECTION INTRAMUSCULAR; INTRAVENOUS; SUBCUTANEOUS ONCE
Refills: 0 | Status: COMPLETED | OUTPATIENT
Start: 2022-01-01 | End: 2022-01-01

## 2022-01-01 RX ADMIN — Medication 650 MILLIGRAM(S): at 10:21

## 2022-01-01 RX ADMIN — MORPHINE SULFATE 4 MILLIGRAM(S): 50 CAPSULE, EXTENDED RELEASE ORAL at 12:27

## 2022-01-01 RX ADMIN — MORPHINE SULFATE 4 MILLIGRAM(S): 50 CAPSULE, EXTENDED RELEASE ORAL at 10:21

## 2022-01-01 RX ADMIN — FAMOTIDINE 20 MILLIGRAM(S): 10 INJECTION INTRAVENOUS at 10:38

## 2022-01-01 RX ADMIN — SODIUM CHLORIDE 1000 MILLILITER(S): 9 INJECTION INTRAMUSCULAR; INTRAVENOUS; SUBCUTANEOUS at 10:21

## 2022-01-01 RX ADMIN — Medication 30 MILLILITER(S): at 10:20

## 2022-01-01 RX ADMIN — Medication 1 GRAM(S): at 13:01

## 2022-01-01 NOTE — ED PROVIDER NOTE - CARE PROVIDER_API CALL
Yemi Otero (DO)  Gastroenterology  [address] [place] [address]  Phone: [phone]  Fax: [phone]  Follow Up Time: 1-3 Days    Emy Sequeira)  Gastroenterology  [address] [place] [address]  Phone: [phone]  Fax: [phone]  Follow Up Time: 1-3 Days    Devante Diego)  Gastroenterology; Internal Medicine  [address] [place] [address]  Phone: [phone]  Fax: [phone]  Follow Up Time: 1-3 Days

## 2022-01-01 NOTE — ED PROVIDER NOTE - PROGRESS NOTE DETAILS
RK: pocus did not show hydro in b/l renal. difficult to find GB, will get official RUQ sonogram. Ted: Pain initially improved with meds then beginning to come back again, gave more morphine with sulcrulfate, now pt feels better. discussed options of staying for pain control or d/c with pain control at home, as sx likely from gastric ulcer, pt prefer to go home and follow up with his gastroenterologist. advised to not take ibuprofen for now and take tylenol for pain instead, and to avoid alcohol. currently no abdominal tenderness on exam Ted: pocus did not show hydro in b/l renal. difficult to find GB, will get official RUQ sonogram.

## 2022-01-01 NOTE — ED PROVIDER NOTE - NS ED ROS FT
Constitutional: No fever   Eyes:  No visual changes  Ears:  No hearing changes  Neck: No neck pain  Cardiac:  No chest pain  Respiratory:  No SOB   GI:  No nausea, or vomiting +abd pain  :  No dysuria  MS:  No back pain  Neuro:  No headache or weakness.  No LOC  Skin:  No skin rash

## 2022-01-01 NOTE — ED PROVIDER NOTE - NSFOLLOWUPINSTRUCTIONS_ED_ALL_ED_FT
What is a peptic ulcer?  A peptic ulcer is a sore that can form on the lining of the stomach or duodenum. The duodenum is the first part of the small intestine (figure 1).    What are the symptoms of a peptic ulcer?  Some people with peptic ulcers have no symptoms. Other people can have symptoms that include:    ?Pain in the upper belly – Ulcers in the stomach often cause pain soon after a person eats. Ulcers in the duodenum often cause pain or burning when a person's stomach is empty.    ?Bloating, or feeling full after eating a small amount of food    ?Not feeling hungry    ?Nausea or vomiting    All of these symptoms can also be caused by other conditions. But if you have these symptoms, let your doctor or nurse know.    Sometimes, peptic ulcers can lead to serious problems. These include:    ?Bleeding – This can cause smelly and black-colored bowel movements, vomiting blood, or more rarely, bright red bowel movements.    ?A hole in the wall of the stomach or duodenum – This can cause sudden and severe belly pain.    ?Obstruction – This is a blockage of the intestine. It can cause a feeling of fullness, bloating, indigestion, nausea, vomiting, belly pain shortly after eating, and weight loss.    What causes peptic ulcers?  Common causes of peptic ulcers include:    ?An infection in the stomach or duodenum – This kind of infection is caused by a type of bacteria called "Helicobacter pylori" or "H. pylori."    ?Medicines called "nonsteroidal antiinflammatory drugs" (NSAIDs) – NSAIDs include pain-relieving medicines such as aspirin, ibuprofen (sample brand names: Advil, Motrin), and naproxen (sample brand names: Aleve, Naprosyn).    Is there a test for a peptic ulcer?  Yes. If you have symptoms of a peptic ulcer, your doctor might do:    ?Tests to check for H. pylori infection – Doctors can check for H. pylori infection by doing:    •Breath tests – These tests measure substances in a person's breath after they have been given a special liquid to drink    •Lab tests that check a sample of a bowel movement for the infection    ?A procedure called an "upper endoscopy" – During an upper endoscopy, a doctor puts a thin tube with a camera on the end into a person's mouth and down into the stomach and duodenum (figure 2). Then they check the lining of the stomach and duodenum for ulcers.    How are peptic ulcers treated?  Treatment depends on the cause, but most peptic ulcers are treated with medicines.    People with H. pylori infection are often treated with 3 or more medicines for 2 weeks to get rid of the infection. This treatment can include:    ?Medicine to reduce the amount of acid that the stomach makes    ?Different types of antibiotics    Some people need to take medicines that reduce the amount of acid for a longer amount of time. Some people take these medicines for the rest of their life.    It is important to follow all your doctors' instructions about taking your medicines. Let your doctor or nurse know if you have any side effects from your medicines.    People who have serious problems from their peptic ulcers might also need to be treated with surgery.    What happens after treatment?  After treatment, people often have follow-up tests. These can include:    ?Tests to check that the H. pylori infection has gone away    ?An upper endoscopy to check that the peptic ulcer has healed    What else can I do to help a peptic ulcer heal?  To help a peptic ulcer heal and to prevent future peptic ulcers, you can:    ?Not smoke    ?Not take NSAIDs (if possible)

## 2022-01-01 NOTE — ED PROVIDER NOTE - CARE PROVIDERS DIRECT ADDRESSES
,DirectAddress_Unknown,[email or web address]

## 2022-01-01 NOTE — ED PROVIDER NOTE - PROVIDER TOKENS
PROVIDER:[TOKEN:[[id number]],FOLLOWUP:[1-3 Days]],PROVIDER:[TOKEN:[[id number]],FOLLOWUP:[1-3 Days]],PROVIDER:[TOKEN:[[id number]],FOLLOWUP:[1-3 Days]]

## 2022-01-01 NOTE — ED PROVIDER NOTE - PHYSICAL EXAMINATION
CONSTITUTIONAL: well-developed, well-nourished, in no acute distress  SKIN: warm, dry  HEAD: Normocephalic  EYES: no conjunctival erythema  ENT: no nasal discharge, airway clear  NECK: full ROM  CARD: regular rate and rhythm  RESP: normal respiratory effort, no wheezes, rales or rhonchi  ABD: soft, non-distended, epigastric tenderness, no rebound tenderness  EXT: moving all extremities spontaneously  NEURO: alert and oriented, grossly unremarkable  PSYCH: cooperative, appropriate

## 2022-01-01 NOTE — ED PROVIDER NOTE - OBJECTIVE STATEMENT
46yoM w/ pmhx of gallstones, gastritis, GERD, who present with epigastric pain that woke him up from sleep last night. prior to sleeping he ate a lot of fatty food at buffet. only drank 1 glass of wine. nonsmoker. he had endoscopy done 2 year ago which showed gastritis. he takes omeprazole. every 6mo he has this same pain, epigastric constant, which ultimately resolves. no hx of abdominal surgery. denies sob, n, v, back pain, urinary or fecal issues. took nsaids without improvement.

## 2022-01-01 NOTE — ED PROVIDER NOTE - PATIENT PORTAL LINK FT
You can access the FollowMyHealth Patient Portal offered by [facility] by registering at the following website: [email or web address]. By joining [employer]’s FollowMyHealth portal, you will also be able to view your health information using other applications (apps) compatible with our system.

## 2022-01-01 NOTE — ED PROVIDER NOTE - CLINICAL SUMMARY MEDICAL DECISION MAKING FREE TEXT BOX
.    45 yo M pmh GERD, gallstones p/w RUQ and  epigastric pain this early AM, waking him from sleep. Had fatty, rich meal prior evening. NO cp, sob, back pain. Agree w/ ROS PE.    Labs, ekg, cxr reviewed. RUQ US + hepatic steatosis o/w wnl. Pt felt better after meds.    IMP: hepatobiliary vs gastritis/ GERD.  Discussed all available results with Pt.  Pt understands results, plan of care, outpt follow up w/ GI as discussed, and signs and symptoms for ED return.  Pt is comfortable with discharge. DC home.     .

## 2022-01-21 ENCOUNTER — APPOINTMENT (OUTPATIENT)
Age: 47
End: 2022-01-21
Payer: MEDICAID

## 2022-01-21 PROCEDURE — 99443: CPT

## 2022-01-21 RX ORDER — SIMETHICONE 80 MG/1
80 TABLET, CHEWABLE ORAL
Qty: 120 | Refills: 1 | Status: ACTIVE | COMMUNITY
Start: 2022-01-21 | End: 1900-01-01

## 2022-01-21 NOTE — ASSESSMENT
[FreeTextEntry1] : Epigastric pain - acid reflux vs. dyspepsia vs. ulcer\par - switch from omeprazole to pantoprazole\par - start simethicone with meals\par - schedule EGD

## 2022-01-21 NOTE — HISTORY OF PRESENT ILLNESS
[Home] : at home, [unfilled] , at the time of the visit. [Medical Office: ([facility])___] : at the medical office located in  [Verbal consent obtained from patient] : the patient, [unfilled] [FreeTextEntry4] : Kwabena [de-identified] : 46 y/o M who presents with chest/epigastric pain for 3 years. \par \par Pt reports he's had chest/epigastric pain for 3 years after eating food. Denies dysphagia/odynophagia. He had evaluation 3 years ago, was put on omeprazole, which he reports did not help his symptoms. Reports it feels like gas/acid 5 minutes after eating. Denies blood in stool, weight loss. Pt reports he had endoscopy 2 years ago, unrevealing. \par \par Pt was in the ER a few weeks ago for worsening of this pain. RUQ US showed no e/o cholelithiasis. Showed hepatic steatosis, normal LFTs.

## 2022-02-11 ENCOUNTER — EMERGENCY (EMERGENCY)
Facility: HOSPITAL | Age: 47
LOS: 0 days | Discharge: HOME | End: 2022-02-11
Attending: EMERGENCY MEDICINE | Admitting: EMERGENCY MEDICINE
Payer: MEDICAID

## 2022-02-11 VITALS
DIASTOLIC BLOOD PRESSURE: 84 MMHG | HEIGHT: 65 IN | SYSTOLIC BLOOD PRESSURE: 137 MMHG | HEART RATE: 72 BPM | TEMPERATURE: 98 F | WEIGHT: 177.03 LBS | OXYGEN SATURATION: 94 %

## 2022-02-11 DIAGNOSIS — R10.13 EPIGASTRIC PAIN: ICD-10-CM

## 2022-02-11 LAB
ALBUMIN SERPL ELPH-MCNC: 4.7 G/DL — SIGNIFICANT CHANGE UP (ref 3.5–5.2)
ALP SERPL-CCNC: 97 U/L — SIGNIFICANT CHANGE UP (ref 30–115)
ALT FLD-CCNC: 121 U/L — HIGH (ref 0–41)
ANION GAP SERPL CALC-SCNC: 12 MMOL/L — SIGNIFICANT CHANGE UP (ref 7–14)
AST SERPL-CCNC: 73 U/L — HIGH (ref 0–41)
BASOPHILS # BLD AUTO: 0.03 K/UL — SIGNIFICANT CHANGE UP (ref 0–0.2)
BASOPHILS NFR BLD AUTO: 0.4 % — SIGNIFICANT CHANGE UP (ref 0–1)
BILIRUB DIRECT SERPL-MCNC: <0.2 MG/DL — SIGNIFICANT CHANGE UP (ref 0–0.3)
BILIRUB INDIRECT FLD-MCNC: >0.5 MG/DL — SIGNIFICANT CHANGE UP (ref 0.2–1.2)
BILIRUB SERPL-MCNC: 0.7 MG/DL — SIGNIFICANT CHANGE UP (ref 0.2–1.2)
BUN SERPL-MCNC: 14 MG/DL — SIGNIFICANT CHANGE UP (ref 10–20)
CALCIUM SERPL-MCNC: 9.5 MG/DL — SIGNIFICANT CHANGE UP (ref 8.5–10.1)
CHLORIDE SERPL-SCNC: 101 MMOL/L — SIGNIFICANT CHANGE UP (ref 98–110)
CO2 SERPL-SCNC: 23 MMOL/L — SIGNIFICANT CHANGE UP (ref 17–32)
CREAT SERPL-MCNC: 0.8 MG/DL — SIGNIFICANT CHANGE UP (ref 0.7–1.5)
EOSINOPHIL # BLD AUTO: 0.05 K/UL — SIGNIFICANT CHANGE UP (ref 0–0.7)
EOSINOPHIL NFR BLD AUTO: 0.7 % — SIGNIFICANT CHANGE UP (ref 0–8)
GLUCOSE SERPL-MCNC: 115 MG/DL — HIGH (ref 70–99)
HCT VFR BLD CALC: 43.7 % — SIGNIFICANT CHANGE UP (ref 42–52)
HGB BLD-MCNC: 15.2 G/DL — SIGNIFICANT CHANGE UP (ref 14–18)
IMM GRANULOCYTES NFR BLD AUTO: 0.4 % — HIGH (ref 0.1–0.3)
LACTATE SERPL-SCNC: 1 MMOL/L — SIGNIFICANT CHANGE UP (ref 0.7–2)
LIDOCAIN IGE QN: 23 U/L — SIGNIFICANT CHANGE UP (ref 7–60)
LYMPHOCYTES # BLD AUTO: 1.09 K/UL — LOW (ref 1.2–3.4)
LYMPHOCYTES # BLD AUTO: 15.5 % — LOW (ref 20.5–51.1)
MCHC RBC-ENTMCNC: 31.1 PG — HIGH (ref 27–31)
MCHC RBC-ENTMCNC: 34.8 G/DL — SIGNIFICANT CHANGE UP (ref 32–37)
MCV RBC AUTO: 89.5 FL — SIGNIFICANT CHANGE UP (ref 80–94)
MONOCYTES # BLD AUTO: 0.34 K/UL — SIGNIFICANT CHANGE UP (ref 0.1–0.6)
MONOCYTES NFR BLD AUTO: 4.9 % — SIGNIFICANT CHANGE UP (ref 1.7–9.3)
NEUTROPHILS # BLD AUTO: 5.47 K/UL — SIGNIFICANT CHANGE UP (ref 1.4–6.5)
NEUTROPHILS NFR BLD AUTO: 78.1 % — HIGH (ref 42.2–75.2)
NRBC # BLD: 0 /100 WBCS — SIGNIFICANT CHANGE UP (ref 0–0)
PLATELET # BLD AUTO: 225 K/UL — SIGNIFICANT CHANGE UP (ref 130–400)
POTASSIUM SERPL-MCNC: 4.1 MMOL/L — SIGNIFICANT CHANGE UP (ref 3.5–5)
POTASSIUM SERPL-SCNC: 4.1 MMOL/L — SIGNIFICANT CHANGE UP (ref 3.5–5)
PROT SERPL-MCNC: 7.5 G/DL — SIGNIFICANT CHANGE UP (ref 6–8)
RBC # BLD: 4.88 M/UL — SIGNIFICANT CHANGE UP (ref 4.7–6.1)
RBC # FLD: 11.9 % — SIGNIFICANT CHANGE UP (ref 11.5–14.5)
SODIUM SERPL-SCNC: 136 MMOL/L — SIGNIFICANT CHANGE UP (ref 135–146)
WBC # BLD: 7.01 K/UL — SIGNIFICANT CHANGE UP (ref 4.8–10.8)
WBC # FLD AUTO: 7.01 K/UL — SIGNIFICANT CHANGE UP (ref 4.8–10.8)

## 2022-02-11 PROCEDURE — 99284 EMERGENCY DEPT VISIT MOD MDM: CPT

## 2022-02-11 PROCEDURE — 71046 X-RAY EXAM CHEST 2 VIEWS: CPT | Mod: 26

## 2022-02-11 RX ORDER — SODIUM CHLORIDE 9 MG/ML
1000 INJECTION INTRAMUSCULAR; INTRAVENOUS; SUBCUTANEOUS ONCE
Refills: 0 | Status: COMPLETED | OUTPATIENT
Start: 2022-02-11 | End: 2022-02-11

## 2022-02-11 RX ORDER — FAMOTIDINE 10 MG/ML
20 INJECTION INTRAVENOUS ONCE
Refills: 0 | Status: COMPLETED | OUTPATIENT
Start: 2022-02-11 | End: 2022-02-11

## 2022-02-11 RX ADMIN — FAMOTIDINE 20 MILLIGRAM(S): 10 INJECTION INTRAVENOUS at 18:20

## 2022-02-11 RX ADMIN — Medication 30 MILLILITER(S): at 18:20

## 2022-02-11 RX ADMIN — SODIUM CHLORIDE 1000 MILLILITER(S): 9 INJECTION INTRAMUSCULAR; INTRAVENOUS; SUBCUTANEOUS at 18:19

## 2022-02-11 NOTE — ED PROVIDER NOTE - PATIENT PORTAL LINK FT
You can access the FollowMyHealth Patient Portal offered by [facility] by registering at the following website: [email or web address]. By joining [employer]’s FollowMyHealth portal, you will also be able to view your health information using other applications (apps) compatible with our system.

## 2022-02-11 NOTE — ED PROVIDER NOTE - PHYSICAL EXAMINATION
VITAL SIGNS: I have reviewed nursing notes and confirm.  CONSTITUTIONAL: Well-developed; well-nourished; in no acute distress.   SKIN: skin exam is warm and dry, no acute rash.    HEAD: Normocephalic; atraumatic.  EYES: conjunctiva and sclera clear.  ENT: No nasal discharge; airway clear..  CARD: S1, S2 normal; no murmurs, gallops, or rubs. Regular rate and rhythm.   RESP: No wheezes, rales or rhonchi.  ABD: Normal bowel sounds; soft; non-distended; non-tender  EXT: Normal ROM.  No clubbing, cyanosis or edema.

## 2022-02-11 NOTE — ED PROVIDER NOTE - OBJECTIVE STATEMENT
Pt is a 48y/o male with a pmhx if gastritis here for eval of 2 weeks of epigastric discomfort/bloating with associated sour taste in mouth that has been progressively worsening which prompted visit. Pt has outpatient EGD scheduled on 2/23. Pt denies fever, chils, weakness, numbness, n/v/d.

## 2022-02-11 NOTE — ED PROVIDER NOTE - NS ED ROS FT
Eyes:  No visual changes, eye pain or discharge.  ENMT:  No hearing changes, pain, discharge or infections. No neck pain or stiffness.  Cardiac:  No chest pain, SOB or edema. No chest pain with exertion.  Respiratory:  No cough or respiratory distress. No hemoptysis. No history of asthma or RAD.  GI:  + abd discomfort No nausea, vomiting, diarrhea   :  No dysuria, frequency or burning.  MS:  No myalgia, muscle weakness, joint pain or back pain.  Neuro:  No headache or weakness.  No LOC.  Skin:  No skin rash.   Endocrine: No history of thyroid disease or diabetes.  Except as documented in the HPI,  all other systems are negative.

## 2022-02-11 NOTE — ED ADULT TRIAGE NOTE - CHIEF COMPLAINT QUOTE
Patient presents to ED c/o sour taste in mouth x 1 week. Patient also reports dizziness and worsening abdominal distention. Patient a&o3, passes neurological exam in triage.

## 2022-02-11 NOTE — ED PROVIDER NOTE - ATTENDING CONTRIBUTION TO CARE
46 yo m with pmh of gastritis, presents with c/o epigastric pain.  pt says hs been having bloating and sour taste in his throat.  has been taking pantoprazole. no n/v/d, no back pain, no cp, no sob.  pt has egd scheduled for 2/23.  no fever, no chills.  exam: nad, ncat, perrl, eomi, mmm, rrr, ctab, abd soft, nt, nd imp: pt with epig pain, h/o gastritis, sour taste, likely gastritis, symptomatic tx, check labs

## 2022-02-21 ENCOUNTER — LABORATORY RESULT (OUTPATIENT)
Age: 47
End: 2022-02-21

## 2022-02-24 ENCOUNTER — RESULT REVIEW (OUTPATIENT)
Age: 47
End: 2022-02-24

## 2022-02-24 ENCOUNTER — TRANSCRIPTION ENCOUNTER (OUTPATIENT)
Age: 47
End: 2022-02-24

## 2022-02-24 ENCOUNTER — OUTPATIENT (OUTPATIENT)
Dept: OUTPATIENT SERVICES | Facility: HOSPITAL | Age: 47
LOS: 1 days | Discharge: HOME | End: 2022-02-24
Payer: MEDICAID

## 2022-02-24 VITALS
HEART RATE: 66 BPM | DIASTOLIC BLOOD PRESSURE: 69 MMHG | SYSTOLIC BLOOD PRESSURE: 138 MMHG | RESPIRATION RATE: 20 BRPM | OXYGEN SATURATION: 98 %

## 2022-02-24 VITALS
HEART RATE: 60 BPM | RESPIRATION RATE: 18 BRPM | HEIGHT: 65 IN | TEMPERATURE: 97 F | WEIGHT: 169.98 LBS | DIASTOLIC BLOOD PRESSURE: 92 MMHG | SYSTOLIC BLOOD PRESSURE: 144 MMHG

## 2022-02-24 PROCEDURE — 88312 SPECIAL STAINS GROUP 1: CPT | Mod: 26

## 2022-02-24 PROCEDURE — 88305 TISSUE EXAM BY PATHOLOGIST: CPT | Mod: 26

## 2022-02-24 PROCEDURE — 43239 EGD BIOPSY SINGLE/MULTIPLE: CPT

## 2022-02-24 RX ORDER — PANTOPRAZOLE SODIUM 20 MG/1
1 TABLET, DELAYED RELEASE ORAL
Qty: 0 | Refills: 0 | DISCHARGE

## 2022-02-24 NOTE — ASU DISCHARGE PLAN (ADULT/PEDIATRIC) - NS MD DC FALL RISK RISK
For information on Fall & Injury Prevention, visit: [email or web address] OR  [email or web address] OR  https://www.cdc.gov/steadi/patient.html

## 2022-02-24 NOTE — CHART NOTE - NSCHARTNOTEFT_GEN_A_CORE
PACU ANESTHESIA ADMISSION NOTE      Procedure: EGD  Post op diagnosis:  GERD    ____  Intubated  TV:______       Rate: ______      FiO2: ______    __x__  Patent Airway    __x__  Full return of protective reflexes    __x__  Full recovery from anesthesia / back to baseline status    Vitals:  T(C): 36.2 (02-24-22 @ 14:16), Max: 36.2 (02-24-22 @ 14:16)  HR: 60 (02-24-22 @ 14:16) (60 - 60)  BP: 144/92 (02-24-22 @ 14:16) (144/92 - 144/92)  RR: 18 (02-24-22 @ 14:16) (18 - 18)  SpO2: --    Mental Status:  __x__ Awake   ___x__ Alert   _____ Drowsy   _____ Sedated    Nausea/Vomiting:  __x__ NO  ______Yes,   See Post - Op Orders          Pain Scale (0-10):  _____    Treatment: ____ None    __x__ See Post - Op/PCA Orders    Post - Operative Fluids:   ____ Oral   __x__ See Post - Op Orders    Plan: Discharge:   ___x_Home       _____Floor     _____Critical Care    _____  Other:_________________    Comments: Patient had smooth intraoperative event, no anesthesia complication.  PACU Vital signs: HR:      61    BP:  109      /  82        RR:     14        O2 Sat:  98     %     Temp  97.2

## 2022-02-24 NOTE — ASU DISCHARGE PLAN (ADULT/PEDIATRIC) - CARE PROVIDER_API CALL
4 Ginger Dorsey)  Gastroenterology; Internal Medicine  [address] [place] [address]  Phone: [phone]  Fax: [phone]  Follow Up Time:

## 2022-02-24 NOTE — ASU DISCHARGE PLAN (ADULT/PEDIATRIC) - PHYSICIAN SECTION COMPLETE
MRN:[id number]                      After Visit Summary   8/8/2018    Clarissa Paulino    MRN: [id number]           Visit Information        Provider Department      8/8/2018 10:30 AM 1, Sh Tabatha Pina, FER [facility] - [facility]      [facility] Information     [facility] gives you secure access to your electronic health record. If you see a primary care provider, you can also send messages to your care team and make appointments. If you have questions, please call your primary care clinic.  If you do not have a primary care provider, please call [phone] and they will assist you.        Care EveryWhere ID     This is your Care EveryWhere ID. This could be used by other organizations to access your [facility] medical records  [id number]        Equal Access to Services     QIAN SANCHEZ : Alec Broderick, wamanoj phan, topher castellanosalbay roberts, soren shelley. So [facility] [phone].    ATENCIÓN: Si habla español, tiene a chirinos disposición servicios gratuitos de asistencia lingüística. Llame al [phone].    We comply with applicable federal civil rights laws and [place] laws. We do not discriminate on the basis of race, color, national origin, age, disability, sex, sexual orientation, or gender identity.             Yes

## 2022-02-24 NOTE — ASU PATIENT PROFILE, ADULT - FALL HARM RISK - UNIVERSAL INTERVENTIONS
Bed in lowest position, wheels locked, appropriate side rails in place/Call bell, personal items and telephone in reach/Instruct patient to call for assistance before getting out of bed or chair/Non-slip footwear when patient is out of bed/[place] to call system/Physically safe environment - no spills, clutter or unnecessary equipment/Purposeful Proactive Rounding/Room/bathroom lighting operational, light cord in reach

## 2022-02-27 ENCOUNTER — EMERGENCY (EMERGENCY)
Facility: HOSPITAL | Age: 47
LOS: 0 days | Discharge: HOME | End: 2022-02-27
Attending: STUDENT IN AN ORGANIZED HEALTH CARE EDUCATION/TRAINING PROGRAM | Admitting: STUDENT IN AN ORGANIZED HEALTH CARE EDUCATION/TRAINING PROGRAM
Payer: MEDICAID

## 2022-02-27 VITALS
DIASTOLIC BLOOD PRESSURE: 79 MMHG | SYSTOLIC BLOOD PRESSURE: 129 MMHG | TEMPERATURE: 98 F | RESPIRATION RATE: 18 BRPM | HEART RATE: 76 BPM | OXYGEN SATURATION: 100 %

## 2022-02-27 VITALS
SYSTOLIC BLOOD PRESSURE: 154 MMHG | HEART RATE: 79 BPM | HEIGHT: 65 IN | TEMPERATURE: 98 F | OXYGEN SATURATION: 99 % | DIASTOLIC BLOOD PRESSURE: 76 MMHG | WEIGHT: 169.98 LBS | RESPIRATION RATE: 18 BRPM

## 2022-02-27 DIAGNOSIS — R10.9 UNSPECIFIED ABDOMINAL PAIN: ICD-10-CM

## 2022-02-27 DIAGNOSIS — Z87.19 PERSONAL HISTORY OF OTHER DISEASES OF THE DIGESTIVE SYSTEM: ICD-10-CM

## 2022-02-27 DIAGNOSIS — I10 ESSENTIAL (PRIMARY) HYPERTENSION: ICD-10-CM

## 2022-02-27 PROBLEM — K21.9 GASTRO-ESOPHAGEAL REFLUX DISEASE WITHOUT ESOPHAGITIS: Chronic | Status: ACTIVE | Noted: 2022-02-24

## 2022-02-27 LAB
ALBUMIN SERPL ELPH-MCNC: 4.4 G/DL — SIGNIFICANT CHANGE UP (ref 3.5–5.2)
ALP SERPL-CCNC: 92 U/L — SIGNIFICANT CHANGE UP (ref 30–115)
ALT FLD-CCNC: 34 U/L — SIGNIFICANT CHANGE UP (ref 0–41)
ANION GAP SERPL CALC-SCNC: 9 MMOL/L — SIGNIFICANT CHANGE UP (ref 7–14)
AST SERPL-CCNC: 19 U/L — SIGNIFICANT CHANGE UP (ref 0–41)
BASOPHILS # BLD AUTO: 0.03 K/UL — SIGNIFICANT CHANGE UP (ref 0–0.2)
BASOPHILS NFR BLD AUTO: 0.7 % — SIGNIFICANT CHANGE UP (ref 0–1)
BILIRUB SERPL-MCNC: 0.6 MG/DL — SIGNIFICANT CHANGE UP (ref 0.2–1.2)
BLD GP AB SCN SERPL QL: SIGNIFICANT CHANGE UP
BUN SERPL-MCNC: 17 MG/DL — SIGNIFICANT CHANGE UP (ref 10–20)
CALCIUM SERPL-MCNC: 9.4 MG/DL — SIGNIFICANT CHANGE UP (ref 8.5–10.1)
CHLORIDE SERPL-SCNC: 105 MMOL/L — SIGNIFICANT CHANGE UP (ref 98–110)
CO2 SERPL-SCNC: 26 MMOL/L — SIGNIFICANT CHANGE UP (ref 17–32)
CREAT SERPL-MCNC: 0.8 MG/DL — SIGNIFICANT CHANGE UP (ref 0.7–1.5)
EOSINOPHIL # BLD AUTO: 0.1 K/UL — SIGNIFICANT CHANGE UP (ref 0–0.7)
EOSINOPHIL NFR BLD AUTO: 2.3 % — SIGNIFICANT CHANGE UP (ref 0–8)
GLUCOSE SERPL-MCNC: 114 MG/DL — HIGH (ref 70–99)
HCT VFR BLD CALC: 40.4 % — LOW (ref 42–52)
HGB BLD-MCNC: 13.9 G/DL — LOW (ref 14–18)
IMM GRANULOCYTES NFR BLD AUTO: 0.2 % — SIGNIFICANT CHANGE UP (ref 0.1–0.3)
LIDOCAIN IGE QN: 42 U/L — SIGNIFICANT CHANGE UP (ref 7–60)
LYMPHOCYTES # BLD AUTO: 1.55 K/UL — SIGNIFICANT CHANGE UP (ref 1.2–3.4)
LYMPHOCYTES # BLD AUTO: 35.3 % — SIGNIFICANT CHANGE UP (ref 20.5–51.1)
MCHC RBC-ENTMCNC: 31 PG — SIGNIFICANT CHANGE UP (ref 27–31)
MCHC RBC-ENTMCNC: 34.4 G/DL — SIGNIFICANT CHANGE UP (ref 32–37)
MCV RBC AUTO: 90.2 FL — SIGNIFICANT CHANGE UP (ref 80–94)
MONOCYTES # BLD AUTO: 0.34 K/UL — SIGNIFICANT CHANGE UP (ref 0.1–0.6)
MONOCYTES NFR BLD AUTO: 7.7 % — SIGNIFICANT CHANGE UP (ref 1.7–9.3)
NEUTROPHILS # BLD AUTO: 2.36 K/UL — SIGNIFICANT CHANGE UP (ref 1.4–6.5)
NEUTROPHILS NFR BLD AUTO: 53.8 % — SIGNIFICANT CHANGE UP (ref 42.2–75.2)
NRBC # BLD: 0 /100 WBCS — SIGNIFICANT CHANGE UP (ref 0–0)
PLATELET # BLD AUTO: 214 K/UL — SIGNIFICANT CHANGE UP (ref 130–400)
POTASSIUM SERPL-MCNC: 4.5 MMOL/L — SIGNIFICANT CHANGE UP (ref 3.5–5)
POTASSIUM SERPL-SCNC: 4.5 MMOL/L — SIGNIFICANT CHANGE UP (ref 3.5–5)
PROT SERPL-MCNC: 7.1 G/DL — SIGNIFICANT CHANGE UP (ref 6–8)
RBC # BLD: 4.48 M/UL — LOW (ref 4.7–6.1)
RBC # FLD: 11.9 % — SIGNIFICANT CHANGE UP (ref 11.5–14.5)
SODIUM SERPL-SCNC: 140 MMOL/L — SIGNIFICANT CHANGE UP (ref 135–146)
WBC # BLD: 4.39 K/UL — LOW (ref 4.8–10.8)
WBC # FLD AUTO: 4.39 K/UL — LOW (ref 4.8–10.8)

## 2022-02-27 PROCEDURE — 99285 EMERGENCY DEPT VISIT HI MDM: CPT

## 2022-02-27 PROCEDURE — 74177 CT ABD & PELVIS W/CONTRAST: CPT | Mod: 26,MA

## 2022-02-27 RX ORDER — PANTOPRAZOLE SODIUM 20 MG/1
40 TABLET, DELAYED RELEASE ORAL ONCE
Refills: 0 | Status: DISCONTINUED | OUTPATIENT
Start: 2022-02-27 | End: 2022-02-27

## 2022-02-27 NOTE — ED PROVIDER NOTE - CARE PROVIDERS DIRECT ADDRESSES
No
,[email or web address]

## 2022-02-27 NOTE — ED PROVIDER NOTE - ATTENDING CONTRIBUTION TO CARE
46 yo m hx htn, nonerrosive gastritis on recent EGD  pt presents for eval of abd pain and dark stools since endoscopy on Thursday prompting eval. no n/v/f/c/cp.  +mild R flank pain.  pt not taking medications right now.     vss  gen- NAD, aaox3  card-rrr  lungs-ctab, no wheezing or rhonchi  abd-sntnd, no guarding or rebound  neuro- full str/sensation, cn ii-xii grossly intact, normal coordination    c/f UGIB, r/o complication of EGD  will get basic labs, ekg, trop, ctap

## 2022-02-27 NOTE — ED ADULT TRIAGE NOTE - CHIEF COMPLAINT QUOTE
pt s/p endoscopy on Thursday ; on friday pt stated he started having black tarry stool and states "it sticky" pt c/o right side abdominal pain ; denies n/v/ denies any blood in spit

## 2022-02-27 NOTE — ED ADULT TRIAGE NOTE - SOURCE OF INFORMATION
Patient Colchicine Counseling:  Patient counseled regarding adverse effects including but not limited to stomach upset (nausea, vomiting, stomach pain, or diarrhea).  Patient instructed to limit alcohol consumption while taking this medication.  Colchicine may reduce blood counts especially with prolonged use.  The patient understands that monitoring of kidney function and blood counts may be required, especially at baseline. The patient verbalized understanding of the proper use and possible adverse effects of colchicine.  All of the patient's questions and concerns were addressed.

## 2022-02-27 NOTE — ED PROVIDER NOTE - CLINICAL SUMMARY MEDICAL DECISION MAKING FREE TEXT BOX
pt observed in ED several hours. abd exams benign, ct w/o acute findings. labs w/ mild anemia. no bloody BMs in ED. protonix given. case d/w GI fellow who rec close f/u w/ Dr. Dorsey. pt dc in stable condition w/ strict return precautions

## 2022-02-27 NOTE — ED PROVIDER NOTE - PATIENT PORTAL LINK FT
You can access the FollowMyHealth Patient Portal offered by [facility] by registering at the following website: [email or web address]. By joining [employer]’s FollowMyHealth portal, you will also be able to view your health information using other applications (apps) compatible with our system.

## 2022-02-27 NOTE — ED PROVIDER NOTE - NS ED ROS FT
Review of Systems:  CONSTITUTIONAL - No fever  SKIN - No rash  HEMATOLOGIC - No abnormal bleeding or bruising  RESPIRATORY - No shortness of breath, No cough  CARDIAC -No chest pain, No palpitations  - No dysuria, frequency, hematuria  MUSCULOSKELETAL - No joint paint, No swelling, No back pain  NEUROLOGIC - No numbness, No focal weakness, No headache, No dizziness  All other systems negative, unless specified in HPI

## 2022-02-27 NOTE — ED PROVIDER NOTE - PROGRESS NOTE DETAILS
PP: Labs, Imaging. PP: Hector Abraham GI Fellow, advises outpatient follow up with Dr. Dorsey.     Patient to be discharged from ED. Any available test results were discussed with patient and/or family. Verbal instructions given, including instructions to return to ED immediately for any new, worsening, or concerning symptoms. Patient endorsed understanding. Written discharge instructions additionally given, including follow-up plan.

## 2022-02-27 NOTE — ED PROVIDER NOTE - OBJECTIVE STATEMENT
47Y M with PMH of HTN, GERD presents with CC of abdominal pain. Patient reports that he had an endoscopy done on Thursday and since then has been having dark stools. Endorsing right sided flank pain. Denies fevers, chills, chest pain, SOB, N/V/D, urinary complaints.

## 2022-02-27 NOTE — ED PROVIDER NOTE - CARE PROVIDER_API CALL
Ginger Dorsey)  Gastroenterology; Internal Medicine  [address] [place] [address]  Phone: [phone]  Fax: [phone]  Follow Up Time:

## 2022-02-28 LAB — SURGICAL PATHOLOGY STUDY: SIGNIFICANT CHANGE UP

## 2022-03-02 DIAGNOSIS — K29.50 UNSPECIFIED CHRONIC GASTRITIS WITHOUT BLEEDING: ICD-10-CM

## 2022-03-02 DIAGNOSIS — K21.9 GASTRO-ESOPHAGEAL REFLUX DISEASE WITHOUT ESOPHAGITIS: ICD-10-CM

## 2022-03-02 DIAGNOSIS — K44.9 DIAPHRAGMATIC HERNIA WITHOUT OBSTRUCTION OR GANGRENE: ICD-10-CM

## 2022-03-02 DIAGNOSIS — K29.80 DUODENITIS WITHOUT BLEEDING: ICD-10-CM

## 2022-03-24 ENCOUNTER — APPOINTMENT (OUTPATIENT)
Dept: GASTROENTEROLOGY | Facility: CLINIC | Age: 47
End: 2022-03-24
Payer: MEDICAID

## 2022-03-24 DIAGNOSIS — R10.13 EPIGASTRIC PAIN: ICD-10-CM

## 2022-03-24 PROCEDURE — 99443: CPT

## 2022-03-24 NOTE — HISTORY OF PRESENT ILLNESS
[Home] : at home, [unfilled] , at the time of the visit. [Medical Office: ([facility])___] : at the medical office located in  [Verbal consent obtained from patient] : the patient, [unfilled] [___ Month(s) Ago] : [unfilled] month(s) ago [_________] : Performed [unfilled] [FreeTextEntry4] : Kwabena [de-identified] : 46 y/o M who presents with chest/epigastric pain for 3 years. \par \par Pt reports he's had chest/epigastric pain for 3 years after eating food. Denies dysphagia/odynophagia. He had evaluation 3 years ago, was put on omeprazole, which he reports did not help his symptoms. Reports it feels like gas/acid 5 minutes after eating. Denies blood in stool, weight loss. Pt reports he had endoscopy 2 years ago, unrevealing. \par \par Pt had RUQ Us in 2020 that showed gallstones. Pt was in the ER 2/2022 for the abd pain. RUQ US showed no e/o cholelithiasis. Showed hepatic steatosis, AST/ALT elevated. Of note, after EGD pt had black stools for 3 days, went back to the ER and Hgb was 13.9. At that time his AST/ALT were completely normal. \par \par EGD was done 2/2022, normal, negative for HP and celiac. \par \par Currently pt feels occasional discomfort, helped with simethicone.

## 2022-03-24 NOTE — ASSESSMENT
[FreeTextEntry1] : Abd pain - no e/o gastritis, PUD, HP; given his AST/ALT were elevated during ER visit 2/2022 but subsequently went back to normal, suspect pt may be having episodes of biliary colic (recent US showed no gallstones, but US in 2020 showed cholelithiasis)\par - cont simethicone\par - refer to Dr. Pierce for possible CCY \par \par Black stools after EGD - may have had mild bleeding; Hgb stable in ER and pt is now having regular brown stools so do not suspect ongoing GI bleed

## 2022-06-06 ENCOUNTER — EMERGENCY (EMERGENCY)
Facility: HOSPITAL | Age: 47
LOS: 0 days | Discharge: HOME | End: 2022-06-06
Attending: EMERGENCY MEDICINE | Admitting: EMERGENCY MEDICINE
Payer: MEDICAID

## 2022-06-06 VITALS
RESPIRATION RATE: 18 BRPM | TEMPERATURE: 98 F | HEART RATE: 68 BPM | HEIGHT: 65 IN | OXYGEN SATURATION: 98 % | WEIGHT: 173.06 LBS | SYSTOLIC BLOOD PRESSURE: 151 MMHG | DIASTOLIC BLOOD PRESSURE: 89 MMHG

## 2022-06-06 DIAGNOSIS — R10.13 EPIGASTRIC PAIN: ICD-10-CM

## 2022-06-06 DIAGNOSIS — I10 ESSENTIAL (PRIMARY) HYPERTENSION: ICD-10-CM

## 2022-06-06 DIAGNOSIS — K21.9 GASTRO-ESOPHAGEAL REFLUX DISEASE WITHOUT ESOPHAGITIS: ICD-10-CM

## 2022-06-06 LAB
ALBUMIN SERPL ELPH-MCNC: 4.9 G/DL — SIGNIFICANT CHANGE UP (ref 3.5–5.2)
ALP SERPL-CCNC: 130 U/L — HIGH (ref 30–115)
ALT FLD-CCNC: 32 U/L — SIGNIFICANT CHANGE UP (ref 0–41)
ANION GAP SERPL CALC-SCNC: 14 MMOL/L — SIGNIFICANT CHANGE UP (ref 7–14)
APPEARANCE UR: ABNORMAL
AST SERPL-CCNC: 21 U/L — SIGNIFICANT CHANGE UP (ref 0–41)
BACTERIA # UR AUTO: NEGATIVE — SIGNIFICANT CHANGE UP
BASOPHILS # BLD AUTO: 0.04 K/UL — SIGNIFICANT CHANGE UP (ref 0–0.2)
BASOPHILS NFR BLD AUTO: 0.5 % — SIGNIFICANT CHANGE UP (ref 0–1)
BILIRUB SERPL-MCNC: 0.4 MG/DL — SIGNIFICANT CHANGE UP (ref 0.2–1.2)
BILIRUB UR-MCNC: NEGATIVE — SIGNIFICANT CHANGE UP
BUN SERPL-MCNC: 19 MG/DL — SIGNIFICANT CHANGE UP (ref 10–20)
CALCIUM SERPL-MCNC: 9.8 MG/DL — SIGNIFICANT CHANGE UP (ref 8.5–10.1)
CHLORIDE SERPL-SCNC: 101 MMOL/L — SIGNIFICANT CHANGE UP (ref 98–110)
CO2 SERPL-SCNC: 24 MMOL/L — SIGNIFICANT CHANGE UP (ref 17–32)
COLOR SPEC: SIGNIFICANT CHANGE UP
CREAT SERPL-MCNC: 0.9 MG/DL — SIGNIFICANT CHANGE UP (ref 0.7–1.5)
DIFF PNL FLD: ABNORMAL
EGFR: 105 ML/MIN/1.73M2 — SIGNIFICANT CHANGE UP
EOSINOPHIL # BLD AUTO: 0.18 K/UL — SIGNIFICANT CHANGE UP (ref 0–0.7)
EOSINOPHIL NFR BLD AUTO: 2.2 % — SIGNIFICANT CHANGE UP (ref 0–8)
EPI CELLS # UR: 0 /HPF — SIGNIFICANT CHANGE UP (ref 0–5)
GLUCOSE SERPL-MCNC: 119 MG/DL — HIGH (ref 70–99)
GLUCOSE UR QL: NEGATIVE — SIGNIFICANT CHANGE UP
HCT VFR BLD CALC: 43.1 % — SIGNIFICANT CHANGE UP (ref 42–52)
HGB BLD-MCNC: 15 G/DL — SIGNIFICANT CHANGE UP (ref 14–18)
HYALINE CASTS # UR AUTO: 0 /LPF — SIGNIFICANT CHANGE UP (ref 0–7)
IMM GRANULOCYTES NFR BLD AUTO: 0.4 % — HIGH (ref 0.1–0.3)
KETONES UR-MCNC: NEGATIVE — SIGNIFICANT CHANGE UP
LEUKOCYTE ESTERASE UR-ACNC: NEGATIVE — SIGNIFICANT CHANGE UP
LIDOCAIN IGE QN: 39 U/L — SIGNIFICANT CHANGE UP (ref 7–60)
LYMPHOCYTES # BLD AUTO: 1.59 K/UL — SIGNIFICANT CHANGE UP (ref 1.2–3.4)
LYMPHOCYTES # BLD AUTO: 19.6 % — LOW (ref 20.5–51.1)
MCHC RBC-ENTMCNC: 31.1 PG — HIGH (ref 27–31)
MCHC RBC-ENTMCNC: 34.8 G/DL — SIGNIFICANT CHANGE UP (ref 32–37)
MCV RBC AUTO: 89.2 FL — SIGNIFICANT CHANGE UP (ref 80–94)
MONOCYTES # BLD AUTO: 0.57 K/UL — SIGNIFICANT CHANGE UP (ref 0.1–0.6)
MONOCYTES NFR BLD AUTO: 7 % — SIGNIFICANT CHANGE UP (ref 1.7–9.3)
NEUTROPHILS # BLD AUTO: 5.72 K/UL — SIGNIFICANT CHANGE UP (ref 1.4–6.5)
NEUTROPHILS NFR BLD AUTO: 70.3 % — SIGNIFICANT CHANGE UP (ref 42.2–75.2)
NITRITE UR-MCNC: NEGATIVE — SIGNIFICANT CHANGE UP
NRBC # BLD: 0 /100 WBCS — SIGNIFICANT CHANGE UP (ref 0–0)
PH UR: 7 — SIGNIFICANT CHANGE UP (ref 5–8)
PLATELET # BLD AUTO: 227 K/UL — SIGNIFICANT CHANGE UP (ref 130–400)
POTASSIUM SERPL-MCNC: 4.3 MMOL/L — SIGNIFICANT CHANGE UP (ref 3.5–5)
POTASSIUM SERPL-SCNC: 4.3 MMOL/L — SIGNIFICANT CHANGE UP (ref 3.5–5)
PROT SERPL-MCNC: 7.8 G/DL — SIGNIFICANT CHANGE UP (ref 6–8)
PROT UR-MCNC: SIGNIFICANT CHANGE UP
RBC # BLD: 4.83 M/UL — SIGNIFICANT CHANGE UP (ref 4.7–6.1)
RBC # FLD: 12.2 % — SIGNIFICANT CHANGE UP (ref 11.5–14.5)
RBC CASTS # UR COMP ASSIST: 9 /HPF — HIGH (ref 0–4)
SODIUM SERPL-SCNC: 139 MMOL/L — SIGNIFICANT CHANGE UP (ref 135–146)
SP GR SPEC: 1.03 — SIGNIFICANT CHANGE UP (ref 1.01–1.03)
UROBILINOGEN FLD QL: SIGNIFICANT CHANGE UP
WBC # BLD: 8.13 K/UL — SIGNIFICANT CHANGE UP (ref 4.8–10.8)
WBC # FLD AUTO: 8.13 K/UL — SIGNIFICANT CHANGE UP (ref 4.8–10.8)
WBC UR QL: 1 /HPF — SIGNIFICANT CHANGE UP (ref 0–5)

## 2022-06-06 PROCEDURE — 99285 EMERGENCY DEPT VISIT HI MDM: CPT

## 2022-06-06 PROCEDURE — 93010 ELECTROCARDIOGRAM REPORT: CPT

## 2022-06-06 PROCEDURE — 71045 X-RAY EXAM CHEST 1 VIEW: CPT | Mod: 26

## 2022-06-06 RX ORDER — SODIUM CHLORIDE 9 MG/ML
1000 INJECTION INTRAMUSCULAR; INTRAVENOUS; SUBCUTANEOUS ONCE
Refills: 0 | Status: COMPLETED | OUTPATIENT
Start: 2022-06-06 | End: 2022-06-06

## 2022-06-06 RX ORDER — ONDANSETRON 8 MG/1
4 TABLET, FILM COATED ORAL ONCE
Refills: 0 | Status: COMPLETED | OUTPATIENT
Start: 2022-06-06 | End: 2022-06-06

## 2022-06-06 RX ORDER — FAMOTIDINE 10 MG/ML
20 INJECTION INTRAVENOUS ONCE
Refills: 0 | Status: COMPLETED | OUTPATIENT
Start: 2022-06-06 | End: 2022-06-06

## 2022-06-06 RX ORDER — SODIUM CHLORIDE 9 MG/ML
1000 INJECTION, SOLUTION INTRAVENOUS ONCE
Refills: 0 | Status: COMPLETED | OUTPATIENT
Start: 2022-06-06 | End: 2022-06-06

## 2022-06-06 RX ORDER — LIDOCAINE 4 G/100G
20 CREAM TOPICAL ONCE
Refills: 0 | Status: COMPLETED | OUTPATIENT
Start: 2022-06-06 | End: 2022-06-06

## 2022-06-06 RX ADMIN — FAMOTIDINE 20 MILLIGRAM(S): 10 INJECTION INTRAVENOUS at 07:24

## 2022-06-06 RX ADMIN — SODIUM CHLORIDE 1000 MILLILITER(S): 9 INJECTION INTRAMUSCULAR; INTRAVENOUS; SUBCUTANEOUS at 07:24

## 2022-06-06 RX ADMIN — Medication 30 MILLILITER(S): at 07:23

## 2022-06-06 RX ADMIN — LIDOCAINE 20 MILLILITER(S): 4 CREAM TOPICAL at 08:51

## 2022-06-06 RX ADMIN — SODIUM CHLORIDE 1000 MILLILITER(S): 9 INJECTION, SOLUTION INTRAVENOUS at 08:51

## 2022-06-06 RX ADMIN — ONDANSETRON 4 MILLIGRAM(S): 8 TABLET, FILM COATED ORAL at 07:23

## 2022-06-06 NOTE — ED PROVIDER NOTE - NSFOLLOWUPINSTRUCTIONS_ED_ALL_ED_FT
Our Emergency Department Referral Coordinators will be reaching out ot you in the next 24-48 hours from 9:00am to 5:00pm (Monday to Friday) with a follow up appointment. Please expect a phone call from the hospital in that time frame. If you do not receive a call or if you have any questions or concerns, you can reach them at [phone] or [phone].    Please follow-up with the gastroenterologist to discuss your recent stomach pain.     Gastritis    Gastritis is soreness and swelling (inflammation) of the lining of the stomach. Gastritis can develop as a sudden onset (acute) or long-term (chronic) condition. If gastritis is not treated, it can lead to stomach bleeding and ulcers. Causes include viral and bacterial infections, excessive alcohol consumption, tobacco use, or certain medications. Symptoms include abdominal pain or burning especially after eating, nausea, vomiting. Avoid foods or drinks that make your symptoms worse such as caffeine, chocolate, spicy foods, acidic foods, alcohol.    SEEK IMMEDIATE MEDICAL CARE IF YOU HAVE THE FOLLOWING SYMPTOMS: black or bloody stools, blood or coffee-ground-colored vomitus, worsening abdominal pain, fever, or inability to keep fluids down.

## 2022-06-06 NOTE — ED PROVIDER NOTE - PATIENT PORTAL LINK FT
You can access the FollowMyHealth Patient Portal offered by [facility] by registering at the following website: [email or web address]. By joining [employer]’s FollowMyHealth portal, you will also be able to view your health information using other applications (apps) compatible with our system.

## 2022-06-06 NOTE — ED PROVIDER NOTE - CLINICAL SUMMARY MEDICAL DECISION MAKING FREE TEXT BOX
47-year-old male with a past medical history of hypertension and GERD, presents with abdominal discomfort for the last couple of days.  Has been having barbecue food and drinking beer more frequently.  No fever.  Some nausea but no severe emesis.  Denies chest pain or shortness of breath.  No GI bleed symptoms.  On exam nontoxic, vital signs stable, heart regular no murmurs, lungs clear bilaterally, mild epigastric tenderness, no rebound or guarding, no tenderness anywhere else including no right upper or lower quadrant tenderness.  Labs overall reassuring, chest x-ray clear, EKG nonischemic.  Given fairly reassuring abdominal exam do not feel he needs emergent imaging.  Patient giving medicine for GI discomfort and improved. In my opinion, based on current evaluation and results, an acute medical or surgical emergency does not appear to be occurring at this time and I feel that the pt is stable for further outpatient work up and/or treatment. Return precautions discussed. refer GI

## 2022-06-06 NOTE — ED PROVIDER NOTE - OBJECTIVE STATEMENT
47-year-old male history of hypertension, GERD presents with epigastric pain that started yesterday evening.  Patient notes that he was eating a lot of barbecue and drank 12 beers at an event.  Patient denies daily drinking notes that the pain is epigastric initially radiating around his belly but now stays in the middle of his abdomen.  Denies any fevers or vomiting or diarrhea notes mild nausea.  Patient states this pain feels similar to his gastritis.  Patient no longer takes his omeprazole as prescribed.

## 2022-06-06 NOTE — ED ADULT NURSE REASSESSMENT NOTE - NS ED NURSE REASSESS COMMENT FT1
Awaiting to be seen by MD , pt AO x 4 , reports diffuse abdominal pain , no vomiting noted , denies chest pain , denies headache , denies dizziness , no SOB, admits drinking beer  about 10 bottles of beer last Saturday and 4 bottles of beer last Sunday due to weekend friends barbecue party but not daily beer user per patient , no tremors , not diaphoretic .

## 2022-06-06 NOTE — ED PROVIDER NOTE - PROGRESS NOTE DETAILS
pt notes symptoms improved after treatment, requesting to go home. Patient to be discharged from ED. Any available test results were discussed with patient and/or family. Verbal instructions given, including instructions to return to ED immediately for any new, worsening, or concerning symptoms. Patient endorsed understanding. Written discharge instructions additionally given, including follow-up plan.

## 2022-06-06 NOTE — ED PROVIDER NOTE - PHYSICAL EXAMINATION
Vital Signs: I have reviewed the initial vital signs.  Constitutional: appears stated age, no acute distress.  HEENT: Airway patent, moist MM, no erythema/swelling/deformity of oral structures. EOMI, PERRLA.  CV: regular rate, regular rhythm, well-perfused extremities, 2+ b/l DP and radial pulses equal.  Lungs: BCTA, no increased WOB.  ABD: soft, minimal ttp over epigastric region, ND, no guarding or rebound, no pulsatile mass, no hernias, no flank pain.   MSK: Neck supple, nontender, nl ROM, no stepoff. Chest nontender. Back nontender in TLS spine or to b/l bony structures. Ext nontender, nl rom, no deformity.   INTEG: Skin warm, dry, no rash.  NEURO: A&Ox3, moving all extremities, normal speech  PSYCH: Calm, cooperative, normal affect and interaction.

## 2022-06-06 NOTE — ED PROVIDER NOTE - WR ORDER ID 1
----- Message from Yara King sent at 2/15/2018  2:29 PM CST -----  Contact: Sari/ wife [phone] cell phone  Patient's wife was reading the avs and it says left extremity but it should be right. On the synopsis of the visit, everything says left and it should be right. Please call her .   [id number]

## 2022-06-07 LAB
CULTURE RESULTS: NO GROWTH — SIGNIFICANT CHANGE UP
SPECIMEN SOURCE: SIGNIFICANT CHANGE UP

## 2022-06-27 ENCOUNTER — APPOINTMENT (OUTPATIENT)
Dept: GASTROENTEROLOGY | Facility: CLINIC | Age: 47
End: 2022-06-27
Payer: MEDICAID

## 2022-06-27 VITALS
HEART RATE: 66 BPM | DIASTOLIC BLOOD PRESSURE: 86 MMHG | SYSTOLIC BLOOD PRESSURE: 131 MMHG | BODY MASS INDEX: 33.61 KG/M2 | WEIGHT: 178 LBS | HEIGHT: 61 IN

## 2022-06-27 DIAGNOSIS — K29.70 GASTRITIS, UNSPECIFIED, W/OUT BLEEDING: ICD-10-CM

## 2022-06-27 PROCEDURE — 99212 OFFICE O/P EST SF 10 MIN: CPT

## 2022-06-27 RX ORDER — DICYCLOMINE HYDROCHLORIDE 10 MG/1
10 CAPSULE ORAL
Qty: 90 | Refills: 0 | Status: DISCONTINUED | COMMUNITY
Start: 2022-02-24 | End: 2022-06-27

## 2022-06-27 RX ORDER — SIMETHICONE 80 MG/1
80 TABLET, CHEWABLE ORAL
Qty: 120 | Refills: 1 | Status: DISCONTINUED | COMMUNITY
Start: 2022-02-24 | End: 2022-06-27

## 2022-06-27 RX ORDER — SIMETHICONE 125 MG/1
125 TABLET, CHEWABLE ORAL
Qty: 120 | Refills: 0 | Status: ACTIVE | COMMUNITY
Start: 2022-06-27 | End: 1900-01-01

## 2022-06-27 NOTE — ASSESSMENT
[FreeTextEntry1] : 46yo male presents for follow up of intermittent abdominal pain and bloating previously seen by Dr. Dorsey s/p EGD in 2/2022 showing mild chronic gastritis/duodenitis, no evidence of H pylori or celiac disease. Due to h/o gallstones pt was advised surgery evaluation however pt did not follow up. No anemia or weight loss. Possibly gerd/gastritis vs functional vs biliary colic. \par \par -Recommend repeat abd ultrasound to re-evaluate for gallstones/other biliary pathology (as prior ultrasound did not gallstones, though was seen on scan in 2020) and consider referral again to surgery pending findings\par -Re-trial simethicone qid/prn for bloating\par -Will hold off on PPI as had no relief previously\par -Continue NSAID avoidance\par -Discussed dietary and lifestyle modifications\par -Discussed low fodmaps diet and monitoring/avoiding dietary triggers including spicy, greasy foods\par -Encouraged ambulation/physical activity as tolerated (pt mostly sedentary)\par -Will request outside colonoscopy report to confirm findings\par \par Follow up 1 month\par Pt agreeable with plan, advised to contact us if questions/concerns or if new/worsening symptoms

## 2022-06-27 NOTE — HISTORY OF PRESENT ILLNESS
[de-identified] : 48yo male presents for follow up of abdominal pain and bloating previously seen by Dr. Dorsey.\par \par Pt last seen by Dr. Dorsey in 3/2022 with intermittent chest and epigastric pain s/p EGD showing mild chronic gastritis/duodenitis, no evidence of H pylori or celiac disease. Due to h/o gallstones pt was advised surgery evaluation however pt did not follow up.\par Last US in 1/2022 did not show gallstones, however prior US in 2020 did show gallstones.\par \par Pt recently seen in ED for worsening abdominal pain developed after eating bbq and several beers. He reports otherwise had been feeling better in the interval since last visit. \par Pt reporting intermittent epigastric pain and bloating, denies n/v or dysphagia. Denies chest pain though notes intermittent right shoulder pain sometimes with eating. Denies early satiety. Appetite good. states he has been avoiding greasy foods, too much fiber and onions. Did not have relief with PPI, per prior note took simethicone which helped though pt does not recall. \par Reports regular formed bm 2-3x daily at baseline, no blood. Denies weight loss. \par Denies NSAID use.\par \par Reports colonoscopy 2-3 years ago in [place], reportedly had diverticulosis no report currently available for review. \par No known family h/o colon ca

## 2022-06-27 NOTE — REVIEW OF SYSTEMS
[Fever] : no fever [Chills] : no chills [Feeling Tired] : not feeling tired [Recent Weight Loss (___ Lbs)] : no recent weight loss [Sore Throat] : no sore throat [Hoarseness] : no hoarseness [Chest Pain] : no chest pain [Palpitations] : no palpitations [Lower Ext Edema] : no extremity edema [Shortness Of Breath] : no shortness of breath [Cough] : no cough [SOB on Exertion] : no shortness of breath during exertion [Vomiting] : no vomiting [Constipation] : no constipation [Diarrhea] : no diarrhea [Melena] : no melena [Dysuria] : no dysuria [Nocturia] : no nocturia

## 2022-06-27 NOTE — PHYSICAL EXAM
[General Appearance - Alert] : alert [General Appearance - In No Acute Distress] : in no acute distress [General Appearance - Well Nourished] : well nourished [General Appearance - Well-Appearing] : healthy appearing [Sclera] : the sclera and conjunctiva were normal [Hearing Threshold Finger Rub Not [place]] : hearing was normal [] : no respiratory distress [Respiration, Rhythm And Depth] : normal respiratory rhythm and effort [Exaggerated Use Of Accessory Muscles For Inspiration] : no accessory muscle use [Heart Sounds] : normal S1 and S2 [Bowel Sounds] : normal bowel sounds [Abdomen Soft] : soft [Abdomen Tenderness] : non-tender

## 2022-07-08 NOTE — ED ADULT NURSE NOTE - CHIEF COMPLAINT
Department of Emergency Medicine  FIRST PROVIDER TRIAGE NOTE             Independent MLP           7/7/22  2:11 PM EDT    Date of Encounter: 7/7/22   MRN: [id number]      HPI: Lala Davison is a 68 y.o. female who presents to the ED for No chief complaint on file. Patient is a 64-year-old that is presenting today for shortness of breath and concern for A. fib. Patient has some lower extremity swelling. Patient states she never been told that she has atrial fibrillation. ROS: Negative for fever, cough, vomiting or diarrhea. PE: Gen Appearance/Constitutional: alert  HEENT: NC/NT. PERRLA,  Airway patent. Neck: supple     Initial Plan of Care: All treatment areas with department are currently occupied. Plan to order/Initiate the following while awaiting opening in ED: labs, EKG and imaging studies.   Initiate Treatment-Testing, Proceed toTreatment Area When Bed Available for ED Attending/MLP to Continue Care    Electronically signed by Robyn Fuentes PA-C   DD: 7/7/22         Robyn Fuentes PA-C  07/07/22 8029
N2N called patient in transport     Haylee Frias RN  07/08/22 1048
Patient and family brought into triage to be updated on labs and imaging results. Apology provided for long wait. They were made aware of the low magnesium level of 1.1. Patient is on a magnesium supplement. She has had this before. Patient also noted to be in A. fib. Patient not on any anticoagulant. She was made aware of the acuity change to now a 2. Patient once again provided with apology for long wait and encouraged to stay because she may very well need to be admitted due to the concerning laboratory values. I also did call the charge nurse, Yao Rudolph RN and currently we do not have any open beds yet. Patient otherwise neurovascular intact she was provided with sandwich and a drink while she waits. No complaints chest pain or shortness of breath at this time.      Rabia Moffett, DAV - FREDA  07/07/22 7598
Unable to get her blood     MAYRA FLETCHER  07/07/22 7602
The patient is a 46y Male complaining of flank pain.

## 2022-07-22 ENCOUNTER — APPOINTMENT (OUTPATIENT)
Dept: GASTROENTEROLOGY | Facility: CLINIC | Age: 47
End: 2022-07-22

## 2022-09-07 NOTE — ED ADULT NURSE NOTE - NS ED NURSE LEVEL OF CONSCIOUSNESS ORIENTATION
Post-Op Assessment Note    CV Status:  Stable  Pain Score: 0    Pain management: adequate     Mental Status:  Alert and awake   Hydration Status:  Euvolemic   PONV Controlled:  Controlled   Airway Patency:  Patent      Post Op Vitals Reviewed: Yes      Staff: Anesthesiologist, CRNA         No complications documented      BP   140/66   Temp      Pulse  70   Resp   14   SpO2   98
Oriented - self; Oriented - place; Oriented - time

## 2022-09-13 ENCOUNTER — RX RENEWAL (OUTPATIENT)
Age: 47
End: 2022-09-13

## 2022-09-13 RX ORDER — FAMOTIDINE 20 MG/1
20 TABLET, FILM COATED ORAL
Qty: 30 | Refills: 1 | Status: ACTIVE | COMMUNITY
Start: 2022-06-27 | End: 1900-01-01

## 2022-09-15 ENCOUNTER — OUTPATIENT (OUTPATIENT)
Dept: OUTPATIENT SERVICES | Facility: HOSPITAL | Age: 47
LOS: 1 days | Discharge: HOME | End: 2022-09-15

## 2022-09-15 ENCOUNTER — RESULT REVIEW (OUTPATIENT)
Age: 47
End: 2022-09-15

## 2022-09-15 DIAGNOSIS — Z87.19 PERSONAL HISTORY OF OTHER DISEASES OF THE DIGESTIVE SYSTEM: ICD-10-CM

## 2022-09-15 DIAGNOSIS — R10.13 EPIGASTRIC PAIN: ICD-10-CM

## 2022-09-15 PROCEDURE — 76705 ECHO EXAM OF ABDOMEN: CPT | Mod: 26

## 2022-09-16 ENCOUNTER — EMERGENCY (EMERGENCY)
Facility: HOSPITAL | Age: 47
LOS: 0 days | Discharge: HOME | End: 2022-09-17
Attending: EMERGENCY MEDICINE | Admitting: EMERGENCY MEDICINE

## 2022-09-16 VITALS
DIASTOLIC BLOOD PRESSURE: 75 MMHG | TEMPERATURE: 98 F | OXYGEN SATURATION: 100 % | HEART RATE: 112 BPM | RESPIRATION RATE: 18 BRPM | HEIGHT: 65 IN | SYSTOLIC BLOOD PRESSURE: 130 MMHG

## 2022-09-16 DIAGNOSIS — R10.10 UPPER ABDOMINAL PAIN, UNSPECIFIED: ICD-10-CM

## 2022-09-16 DIAGNOSIS — R19.7 DIARRHEA, UNSPECIFIED: ICD-10-CM

## 2022-09-16 DIAGNOSIS — K21.9 GASTRO-ESOPHAGEAL REFLUX DISEASE WITHOUT ESOPHAGITIS: ICD-10-CM

## 2022-09-16 PROCEDURE — 99285 EMERGENCY DEPT VISIT HI MDM: CPT

## 2022-09-16 NOTE — ED ADULT NURSE NOTE - ED STAT RN HANDOFF DETAILS
Report given to receiving RN, gerbino pts history, current condition and reason for admission discussed, safety concerns addressed and reviewed, pt currently in stable condition, IV flushes for patency and site shows no signs or symptoms of infiltrate, dressing is clean dry and intact, pt is aware of plan of care. Pt education deemed successful at time of report after patient demonstrates successful teach back for proficiency.

## 2022-09-16 NOTE — ED ADULT NURSE NOTE - OBJECTIVE STATEMENT
47m presents to the ED with R eye pain, frontal headache, generalized abd pain x two week with mild nausea no vomiting, denies fevers chills

## 2022-09-17 VITALS
HEART RATE: 71 BPM | SYSTOLIC BLOOD PRESSURE: 117 MMHG | DIASTOLIC BLOOD PRESSURE: 67 MMHG | OXYGEN SATURATION: 100 % | RESPIRATION RATE: 19 BRPM

## 2022-09-17 LAB
ALBUMIN SERPL ELPH-MCNC: 4.7 G/DL — SIGNIFICANT CHANGE UP (ref 3.5–5.2)
ALP SERPL-CCNC: 96 U/L — SIGNIFICANT CHANGE UP (ref 30–115)
ALT FLD-CCNC: 30 U/L — SIGNIFICANT CHANGE UP (ref 0–41)
ANION GAP SERPL CALC-SCNC: 12 MMOL/L — SIGNIFICANT CHANGE UP (ref 7–14)
APPEARANCE UR: CLEAR — SIGNIFICANT CHANGE UP
APPEARANCE UR: CLEAR — SIGNIFICANT CHANGE UP
AST SERPL-CCNC: 22 U/L — SIGNIFICANT CHANGE UP (ref 0–41)
BACTERIA # UR AUTO: NEGATIVE — SIGNIFICANT CHANGE UP
BACTERIA # UR AUTO: NEGATIVE — SIGNIFICANT CHANGE UP
BASOPHILS # BLD AUTO: 0.03 K/UL — SIGNIFICANT CHANGE UP (ref 0–0.2)
BASOPHILS NFR BLD AUTO: 0.5 % — SIGNIFICANT CHANGE UP (ref 0–1)
BILIRUB SERPL-MCNC: 0.7 MG/DL — SIGNIFICANT CHANGE UP (ref 0.2–1.2)
BILIRUB UR-MCNC: NEGATIVE — SIGNIFICANT CHANGE UP
BILIRUB UR-MCNC: NEGATIVE — SIGNIFICANT CHANGE UP
BUN SERPL-MCNC: 12 MG/DL — SIGNIFICANT CHANGE UP (ref 10–20)
CALCIUM SERPL-MCNC: 9.2 MG/DL — SIGNIFICANT CHANGE UP (ref 8.4–10.5)
CHLORIDE SERPL-SCNC: 109 MMOL/L — SIGNIFICANT CHANGE UP (ref 98–110)
CO2 SERPL-SCNC: 26 MMOL/L — SIGNIFICANT CHANGE UP (ref 17–32)
COLOR SPEC: SIGNIFICANT CHANGE UP
COLOR SPEC: SIGNIFICANT CHANGE UP
CREAT SERPL-MCNC: 0.8 MG/DL — SIGNIFICANT CHANGE UP (ref 0.7–1.5)
DIFF PNL FLD: ABNORMAL
DIFF PNL FLD: ABNORMAL
EGFR: 108 ML/MIN/1.73M2 — SIGNIFICANT CHANGE UP
EOSINOPHIL # BLD AUTO: 0.11 K/UL — SIGNIFICANT CHANGE UP (ref 0–0.7)
EOSINOPHIL NFR BLD AUTO: 1.8 % — SIGNIFICANT CHANGE UP (ref 0–8)
EPI CELLS # UR: 0 /HPF — SIGNIFICANT CHANGE UP (ref 0–5)
EPI CELLS # UR: 0 /HPF — SIGNIFICANT CHANGE UP (ref 0–5)
GLUCOSE SERPL-MCNC: 83 MG/DL — SIGNIFICANT CHANGE UP (ref 70–99)
GLUCOSE UR QL: NEGATIVE — SIGNIFICANT CHANGE UP
GLUCOSE UR QL: NEGATIVE — SIGNIFICANT CHANGE UP
HCT VFR BLD CALC: 40.7 % — LOW (ref 42–52)
HGB BLD-MCNC: 14.5 G/DL — SIGNIFICANT CHANGE UP (ref 14–18)
HYALINE CASTS # UR AUTO: 0 /LPF — SIGNIFICANT CHANGE UP (ref 0–7)
HYALINE CASTS # UR AUTO: 1 /LPF — SIGNIFICANT CHANGE UP (ref 0–7)
IMM GRANULOCYTES NFR BLD AUTO: 0.2 % — SIGNIFICANT CHANGE UP (ref 0.1–0.3)
KETONES UR-MCNC: NEGATIVE — SIGNIFICANT CHANGE UP
KETONES UR-MCNC: SIGNIFICANT CHANGE UP
LEUKOCYTE ESTERASE UR-ACNC: NEGATIVE — SIGNIFICANT CHANGE UP
LEUKOCYTE ESTERASE UR-ACNC: NEGATIVE — SIGNIFICANT CHANGE UP
LIDOCAIN IGE QN: 31 U/L — SIGNIFICANT CHANGE UP (ref 7–60)
LYMPHOCYTES # BLD AUTO: 1.64 K/UL — SIGNIFICANT CHANGE UP (ref 1.2–3.4)
LYMPHOCYTES # BLD AUTO: 26.6 % — SIGNIFICANT CHANGE UP (ref 20.5–51.1)
MCHC RBC-ENTMCNC: 31.8 PG — HIGH (ref 27–31)
MCHC RBC-ENTMCNC: 35.6 G/DL — SIGNIFICANT CHANGE UP (ref 32–37)
MCV RBC AUTO: 89.3 FL — SIGNIFICANT CHANGE UP (ref 80–94)
MONOCYTES # BLD AUTO: 0.52 K/UL — SIGNIFICANT CHANGE UP (ref 0.1–0.6)
MONOCYTES NFR BLD AUTO: 8.4 % — SIGNIFICANT CHANGE UP (ref 1.7–9.3)
NEUTROPHILS # BLD AUTO: 3.86 K/UL — SIGNIFICANT CHANGE UP (ref 1.4–6.5)
NEUTROPHILS NFR BLD AUTO: 62.5 % — SIGNIFICANT CHANGE UP (ref 42.2–75.2)
NITRITE UR-MCNC: NEGATIVE — SIGNIFICANT CHANGE UP
NITRITE UR-MCNC: NEGATIVE — SIGNIFICANT CHANGE UP
NRBC # BLD: 0 /100 WBCS — SIGNIFICANT CHANGE UP (ref 0–0)
PH UR: 6.5 — SIGNIFICANT CHANGE UP (ref 5–8)
PH UR: 6.5 — SIGNIFICANT CHANGE UP (ref 5–8)
PLATELET # BLD AUTO: 225 K/UL — SIGNIFICANT CHANGE UP (ref 130–400)
POTASSIUM SERPL-MCNC: 4.3 MMOL/L — SIGNIFICANT CHANGE UP (ref 3.5–5)
POTASSIUM SERPL-SCNC: 4.3 MMOL/L — SIGNIFICANT CHANGE UP (ref 3.5–5)
PROT SERPL-MCNC: 7.2 G/DL — SIGNIFICANT CHANGE UP (ref 6–8)
PROT UR-MCNC: NEGATIVE — SIGNIFICANT CHANGE UP
PROT UR-MCNC: SIGNIFICANT CHANGE UP
RBC # BLD: 4.56 M/UL — LOW (ref 4.7–6.1)
RBC # FLD: 12.3 % — SIGNIFICANT CHANGE UP (ref 11.5–14.5)
RBC CASTS # UR COMP ASSIST: 1 /HPF — SIGNIFICANT CHANGE UP (ref 0–4)
RBC CASTS # UR COMP ASSIST: 3 /HPF — SIGNIFICANT CHANGE UP (ref 0–4)
SODIUM SERPL-SCNC: 147 MMOL/L — HIGH (ref 135–146)
SP GR SPEC: 1.01 — SIGNIFICANT CHANGE UP (ref 1.01–1.03)
SP GR SPEC: 1.02 — SIGNIFICANT CHANGE UP (ref 1.01–1.03)
TROPONIN T SERPL-MCNC: <0.01 NG/ML — SIGNIFICANT CHANGE UP
UROBILINOGEN FLD QL: SIGNIFICANT CHANGE UP
UROBILINOGEN FLD QL: SIGNIFICANT CHANGE UP
WBC # BLD: 6.17 K/UL — SIGNIFICANT CHANGE UP (ref 4.8–10.8)
WBC # FLD AUTO: 6.17 K/UL — SIGNIFICANT CHANGE UP (ref 4.8–10.8)
WBC UR QL: 0 /HPF — SIGNIFICANT CHANGE UP (ref 0–5)
WBC UR QL: 1 /HPF — SIGNIFICANT CHANGE UP (ref 0–5)

## 2022-09-17 PROCEDURE — 93010 ELECTROCARDIOGRAM REPORT: CPT

## 2022-09-17 PROCEDURE — 71046 X-RAY EXAM CHEST 2 VIEWS: CPT | Mod: 26

## 2022-09-17 RX ORDER — SODIUM CHLORIDE 9 MG/ML
1000 INJECTION INTRAMUSCULAR; INTRAVENOUS; SUBCUTANEOUS ONCE
Refills: 0 | Status: COMPLETED | OUTPATIENT
Start: 2022-09-17 | End: 2022-09-17

## 2022-09-17 RX ORDER — SODIUM CHLORIDE 9 MG/ML
1000 INJECTION, SOLUTION INTRAVENOUS ONCE
Refills: 0 | Status: COMPLETED | OUTPATIENT
Start: 2022-09-17 | End: 2022-09-17

## 2022-09-17 RX ORDER — FAMOTIDINE 10 MG/ML
20 INJECTION INTRAVENOUS ONCE
Refills: 0 | Status: COMPLETED | OUTPATIENT
Start: 2022-09-17 | End: 2022-09-17

## 2022-09-17 RX ORDER — ONDANSETRON 8 MG/1
4 TABLET, FILM COATED ORAL ONCE
Refills: 0 | Status: COMPLETED | OUTPATIENT
Start: 2022-09-17 | End: 2022-09-17

## 2022-09-17 RX ADMIN — SODIUM CHLORIDE 1000 MILLILITER(S): 9 INJECTION, SOLUTION INTRAVENOUS at 03:33

## 2022-09-17 RX ADMIN — ONDANSETRON 4 MILLIGRAM(S): 8 TABLET, FILM COATED ORAL at 01:00

## 2022-09-17 RX ADMIN — SODIUM CHLORIDE 1000 MILLILITER(S): 9 INJECTION INTRAMUSCULAR; INTRAVENOUS; SUBCUTANEOUS at 00:56

## 2022-09-17 RX ADMIN — FAMOTIDINE 20 MILLIGRAM(S): 10 INJECTION INTRAVENOUS at 00:57

## 2022-09-17 NOTE — ED PROVIDER NOTE - PATIENT PORTAL LINK FT
You can access the FollowMyHealth Patient Portal offered by [facility] by registering at the following website: [email or web address]. By joining [employer]’s FollowMyHealth portal, you will also be able to view your health information using other applications (apps) compatible with our system.

## 2022-09-17 NOTE — ED PROVIDER NOTE - ATTENDING APP SHARED VISIT CONTRIBUTION OF CARE
Patient is c/o diarrhea x one day. Denies f/c/cp/sob. No travel, no recent abx use. Patient was also have some on-off for few weeks. Denies abd pain in ED.   Vitals reviewed.   Patient is awake, alert, answering questions appropriately, appears comfortable and not in any distress.  Lungs: CTA, no wheezing, no crackles.  Abd: +BS, NT, ND, soft, no rebound, no guarding.   CNS: awake, alert, o x 3, no focal neurologic deficits.  A/P: Diarrhea,   labs, reevaluation.

## 2022-09-17 NOTE — ED PROVIDER NOTE - OBJECTIVE STATEMENT
46 yo male no sig hx present c/o upper abdominal pain off/on for weeks. felt more pain tonight so he comes to ED for evaluation. also reported he had loose bowel (2 times a day) in the past few days. otherwise denies fever/chill/nausea/vomiting/change of appetite/constipation and urinary sxs. Denies HA/dizziness/chest pain/sob/palpitations and pain to extremities and ambulatory difficulty.

## 2022-10-14 ENCOUNTER — APPOINTMENT (OUTPATIENT)
Dept: GASTROENTEROLOGY | Facility: CLINIC | Age: 47
End: 2022-10-14

## 2022-10-24 ENCOUNTER — APPOINTMENT (OUTPATIENT)
Dept: GASTROENTEROLOGY | Facility: CLINIC | Age: 47
End: 2022-10-24

## 2022-10-29 ENCOUNTER — EMERGENCY (EMERGENCY)
Facility: HOSPITAL | Age: 47
LOS: 0 days | Discharge: HOME | End: 2022-10-30
Attending: EMERGENCY MEDICINE | Admitting: EMERGENCY MEDICINE

## 2022-10-29 VITALS
WEIGHT: 169.98 LBS | HEIGHT: 65 IN | RESPIRATION RATE: 18 BRPM | OXYGEN SATURATION: 98 % | TEMPERATURE: 99 F | HEART RATE: 60 BPM | DIASTOLIC BLOOD PRESSURE: 92 MMHG | SYSTOLIC BLOOD PRESSURE: 162 MMHG

## 2022-10-29 DIAGNOSIS — H57.13 OCULAR PAIN, BILATERAL: ICD-10-CM

## 2022-10-29 PROCEDURE — 99283 EMERGENCY DEPT VISIT LOW MDM: CPT

## 2022-10-29 NOTE — ED ADULT TRIAGE NOTE - CHIEF COMPLAINT QUOTE
PT presents to the ED c/o of b/l eye pain. Pt states the R started x1 month ago and the L eye started x1 week ago. PT denies any trauma to the area and any vision problems.

## 2022-10-30 NOTE — ED PROVIDER NOTE - ATTENDING CONTRIBUTION TO CARE
47-year-old male with a past medical history presents to ED for 1 month of right eye pain and 1 week of left eye pain.  Patient states he feels that there is something in his eye despite flushing it multiple times with eyedrops.  Patient denies any trauma or known foreign bodies.  He works as a .  Noted blurred vision no headaches no nausea no vomiting. 47-year-old male with a past medical history presents to ED for 1 month of right eye pain and 1 week of left eye pain.  Patient states he feels that there is something in his eye despite flushing it multiple times with eyedrops.  Patient denies any trauma or known foreign bodies.  He works as a .  Noted blurred vision no headaches no nausea no vomiting.    Const: NAD  Eyes: PERRL, no conjunctival injection, 20/20 vision b/l, no corneal abrasion on fluorescin   HENT:  Neck supple without meningismus   CV: RRR, Warm, well-perfused extremities  RESP: CTA B/L, no tachypnea   MSK: No gross deformities appreciated  Skin: Warm, dry. No rashes  Neuro: Alert, CNs II-XII grossly intact. Sensation and motor function of extremities grossly intact.  Psych: Appropriate mood and affect.    will check eye pressure

## 2022-10-30 NOTE — ED PROVIDER NOTE - OBJECTIVE STATEMENT
48 y/o M with PMH GERD presenting for b/l eye pain and difficulty seeing close objects. Right eye began bothering him 1 month ago and left eye 1 week. Feels like there is something in eye. Denies redness, discharge. Does not use glasses or contacts.

## 2022-10-30 NOTE — ED PROVIDER NOTE - PHYSICAL EXAMINATION
CONSTITUTIONAL: Well-developed; well-nourished; in no acute distress.   SKIN: Warm, dry  HEAD: Normocephalic; atraumatic  EYES: PERRL, EOMI, normal sclera and conjunctiva. No fluorescein uptake. OS 20/20, OD 20/20.   ENT: No nasal discharge; airway clear.  NECK: Supple; non tender.  CARD:  Regular rate and rhythm. Normal S1, S2  RESP: No increased WOB. CTA   EXT: Normal ROM.   NEURO: Alert, oriented, grossly unremarkable  PSYCH: Cooperative, appropriate.

## 2022-10-30 NOTE — ED PROVIDER NOTE - NSFOLLOWUPINSTRUCTIONS_ED_ALL_ED_FT
Artificial Tears eye solution      ¿Qué es kamilla medicamento?    La solución ocular de LÁGRIMAS ARTIFICIALES lisandra la irritación y molestia causadas por ojos secos.    Kamilla medicamento puede ser utilizado para otros usos; si tiene alguna pregunta consulte con rod proveedor de atención médica o con rod farmacéutico.    Kamilla medicamento puede ser utilizado para otros usos; si tiene alguna pregunta consulte con rod proveedor de atención médica o con rod farmacéutico.    MARCAS COMUNES: Advanced Eye Relief, Akwa Tears, Akwa Tears Renewed, Laron Tears, Artificial Tears, Bion Tears, Blink Tears, Clear eyes, Clear eyes Outdoor Dry Eye Protection, FreshKote, GenTeal Mild, GenTeal Moderate, GenTeal PF, Gonak, Goniosoft, Hypo Tears, Isopto Tears, LiquiTears, Lubricating Plus, Moisture Eyes, Moisture Eyes Preservative Free, Murine, Natural Balance Tears, Nature's Tears, Opti-Free, Puralube Tears, Refresh, Refresh Celluvisc, Refresh Endura, Refresh Liquigel, Refresh Plus, Refresh Tears, Retaine CMC, Systane Balance, Systane Complete, Teargen, Tears Naturale Forte, Tears Naturale II, Tears Renewed, TheraTears, Visine Dry Eye Relief, Visine Pure Tears, Visine Tears, Visine Tired Eye Relief, Viva      ¿Qué le tommy informar a mi profesional de la marium antes de monica kamilla medicamento?    Necesitan saber si usted presenta alguno de los siguientes problemas o situaciones:  •cambio en la visión  •infección o traumatismo en los ojos  •si usa lentes de contacto  •kim reacción alérgica o inusual a las lágrimas artificiales, a otros medicamentos, alimentos, colorantes o conservantes  •si está embarazada o buscando quedar embarazada  •si está amamantando a un bebé      ¿Cómo tommy utilizar kamilla medicamento?    Kamilla medicamento es para utilizarse solamente en los ojos. No ingiera por vía oral. Siga las instrucciones de la etiqueta. Lávese las devora antes y después del uso. Incline la loretta ligeramente hacia atrás y lleve el párpado inferior hacia abajo con rod dedo índice para formar kim bolsa. Trate de que la punta del gotero no toque el wm, las yemas de los dedos o cualquier otra superficie. Aplique la cantidad de gotas recetada (por lo general kim o dos gotas) dentro de la bolsa. Cierre el wm suavemente paola unos momentos para permitir que las gotas estén en contacto con el wm. Utilice rod medicamento a intervalos regulares. No utilice el medicamento con kim frecuencia mayor a la indicada.    Hable con rod pediatra para informarse acerca del uso de kamilla medicamento en niños. Aunque kamilla medicamento se puede usar en niños tan pequeños mary de 6 años de edad en casos selectos, existen precauciones que deben tomarse.    Sobredosis: Póngase en contacto inmediatamente con un centro toxicológico o kim sissy de urgencia si usted galen que haya tomado demasiado medicamento.    ATENCIÓN: Kamilla medicamento es solo para usted. No comparta kamilla medicamento con nadie.    Sobredosis: Póngase en contacto inmediatamente con un centro toxicológico o kim sissy de urgencia si usted galen que haya tomado demasiado medicamento.    ATENCIÓN: Kamilla medicamento es solo para usted. No comparta kamilla medicamento con nadie.      ¿Qué sucede si me olvido de kim dosis?    Si olvida kim dosis, adminístrela lo antes posible. Si es kimberlyn la hora de la próxima dosis, aplique solo joe dosis. No use dosis adicionales o dobles.      ¿Qué puede interactuar con kamilla medicamento?    No se anticipan interacciones. Si está usando otras gotas oculares con kamilla medicamento, espere aproximadamente 5 minutos entre cada aplicación de las distintas gotas oculares. [place] asegura que las gotas oculares no interfieran entre sí. Si está usando gotas y pomada para los ojos, use las gotas 10 minutos antes de la pomada para que esta no interfiera con el efecto de las gotas.    Puede ser que esta lista no menciona todas las posibles interacciones. Informe a rod profesional de la marium de todos los productos a base de hierbas, medicamentos de venta narayan o suplementos nutritivos que esté tomando. Si usted fuma, consume bebidas alcohólicas o si utiliza drogas ilegales, indíqueselo también a rod profesional de la marium. Algunas sustancias pueden interactuar con rod medicamento.    Puede ser que esta lista no menciona todas las posibles interacciones. Informe a rod profesional de la marium de todos los productos a base de hierbas, medicamentos de venta narayan o suplementos nutritivos que esté tomando. Si usted fuma, consume bebidas alcohólicas o si utiliza drogas ilegales, indíqueselo también a rod profesional de la marium. Algunas sustancias pueden interactuar con rod medicamento.      ¿A qué tommy estar atento al usar kamilla medicamento?    Si tiene dolor en los ojos, cambios en la visión, enrojecimiento o irritación continua en el wm, o si la afección ocular empeora o dura más de 72 horas, suspenda el uso y consulte a rod profesional de la marium.    Para evitar la contaminación de kamilla producto, no toque ninguna superficie con la punta del recipiente. No comparta kamilla medicamento con nadie. Si el producto cambia de color o se vuelve turbio, no lo use.    Si usa lentes de contacto, debe retirárselos antes de colocarse las gotas en los ojos. Espere al menos 15 minutos después de aplicarse las gotas en los ojos para colocarse nuevamente los lentes de contacto.      ¿Qué efectos secundarios puedo tener al utilizar kamilla medicamento?    Efectos secundarios que debe informar a rod médico o a rod profesional de la marium tan pronto mary sea posible:  •reacciones alérgicas, tales mary erupción cutánea, comezón/picazón o urticaria, e hinchazón de la farhat, los labios o la lengua  •cambio en la visión  •irritación o enrojecimiento de los ojos que empeora o dura más de 72 horas  •dolor ocular    Efectos secundarios que generalmente no requieren atención médica (infórmelos a rod médico o a rod profesional de la marium si persisten o si son molestos):  •escozor o visión borrosa temporal al aplicar las gotas    Puede ser que esta lista no menciona todos los posibles efectos secundarios. Comuníquese a rod médico por asesoramiento médico sobre los efectos secundarios. Usted puede informar los efectos secundarios a la FDA por teléfono al 1-800-FDA-1088.    Puede ser que esta lista no menciona todos los posibles efectos secundarios. Comuníquese a rod médico por asesoramiento médico sobre los efectos secundarios. Usted puede informar los efectos secundarios a la FDA por teléfono al 1-599-PAT-1088.      ¿Dónde tommy guardar mi medicina?    Mantenga fuera del alcance de los niños.    Guarde a temperatura ambiente, entre 15 y 30 grados Celsius (59 y 86 grados Fahrenheit). No congele. Deseche todo el medicamento que no haya utilizado después de la fecha de vencimiento. Kim vez que se abre el producto, la mayoría de los expertos recomienda desecharlo después de 30 días.    ATENCIÓN: Kamilla folleto es un resumen. Puede ser que no cubra toda la posible información. Si usted tiene preguntas acerca de esta medicina, consulte con rod médico, rod farmacéutico o rod profesional de la marium.      © 2022 [employer] (2021-06-22 00:00:00)

## 2022-10-30 NOTE — ED PROVIDER NOTE - NSFOLLOWUPCLINICS_GEN_ALL_ED_FT
[facility]  Ophthalmolgy  [address], [address]  [place] [address]  Phone: [phone]  Fax:   Follow Up Time: 4-6 Days

## 2022-10-30 NOTE — ED PROVIDER NOTE - PATIENT PORTAL LINK FT
You can access the FollowMyHealth Patient Portal offered by [facility] by registering at the following website: [email or web address]. By joining [employer]’s FollowMyHealth portal, you will also be able to view your health information using other applications (apps) compatible with our system.

## 2022-10-30 NOTE — ED PROVIDER NOTE - NS ED ROS FT
Constitutional: No fever  Eyes: +eye pain. No discharge.  ENMT:  No hearing changes, ear pain, sore throat, runny nose, or difficulty swallowing  Cardiac:  No chest pain, SOB or edema. No chest pain with exertion.  Respiratory:  No cough or respiratory distress.   GI:  No nausea, vomiting, diarrhea or abdominal pain.  :  No dysuria, frequency or burning.  MS:  No myalgia, muscle weakness, joint pain or back pain.  Neuro:  No headache or weakness.  No LOC.  Skin:  No skin rash.

## 2022-10-30 NOTE — ED PROVIDER NOTE - CLINICAL SUMMARY MEDICAL DECISION MAKING FREE TEXT BOX
47-year-old male presented ED for eye pain.  Patient had normal eye exam normal ultrasound no fluorescein uptake.  Ocular pressures within normal limits.  Will give patient natural tear drop prescription and follow-up with ophthalmology.  KS home strict return precautions if patient develops any change to his vision worsening pain or redness he should return emergently to the emergency department.  Patient understands.

## 2023-01-03 ENCOUNTER — EMERGENCY (EMERGENCY)
Facility: HOSPITAL | Age: 48
LOS: 0 days | Discharge: HOME | End: 2023-01-03
Attending: EMERGENCY MEDICINE | Admitting: EMERGENCY MEDICINE
Payer: MEDICAID

## 2023-01-03 VITALS
TEMPERATURE: 97 F | HEART RATE: 71 BPM | RESPIRATION RATE: 17 BRPM | DIASTOLIC BLOOD PRESSURE: 93 MMHG | OXYGEN SATURATION: 99 % | WEIGHT: 175.05 LBS | HEIGHT: 65 IN | SYSTOLIC BLOOD PRESSURE: 136 MMHG

## 2023-01-03 DIAGNOSIS — R10.13 EPIGASTRIC PAIN: ICD-10-CM

## 2023-01-03 DIAGNOSIS — K21.9 GASTRO-ESOPHAGEAL REFLUX DISEASE WITHOUT ESOPHAGITIS: ICD-10-CM

## 2023-01-03 DIAGNOSIS — R10.10 UPPER ABDOMINAL PAIN, UNSPECIFIED: ICD-10-CM

## 2023-01-03 LAB
ALBUMIN SERPL ELPH-MCNC: 4.9 G/DL — SIGNIFICANT CHANGE UP (ref 3.5–5.2)
ALP SERPL-CCNC: 112 U/L — SIGNIFICANT CHANGE UP (ref 30–115)
ALT FLD-CCNC: 27 U/L — SIGNIFICANT CHANGE UP (ref 0–41)
ANION GAP SERPL CALC-SCNC: 7 MMOL/L — SIGNIFICANT CHANGE UP (ref 7–14)
AST SERPL-CCNC: 18 U/L — SIGNIFICANT CHANGE UP (ref 0–41)
BASOPHILS # BLD AUTO: 0.04 K/UL — SIGNIFICANT CHANGE UP (ref 0–0.2)
BASOPHILS NFR BLD AUTO: 0.6 % — SIGNIFICANT CHANGE UP (ref 0–1)
BILIRUB SERPL-MCNC: 0.4 MG/DL — SIGNIFICANT CHANGE UP (ref 0.2–1.2)
BUN SERPL-MCNC: 21 MG/DL — HIGH (ref 10–20)
CALCIUM SERPL-MCNC: 9 MG/DL — SIGNIFICANT CHANGE UP (ref 8.4–10.4)
CHLORIDE SERPL-SCNC: 102 MMOL/L — SIGNIFICANT CHANGE UP (ref 98–110)
CO2 SERPL-SCNC: 29 MMOL/L — SIGNIFICANT CHANGE UP (ref 17–32)
CREAT SERPL-MCNC: 0.7 MG/DL — SIGNIFICANT CHANGE UP (ref 0.7–1.5)
EGFR: 113 ML/MIN/1.73M2 — SIGNIFICANT CHANGE UP
EOSINOPHIL # BLD AUTO: 0.16 K/UL — SIGNIFICANT CHANGE UP (ref 0–0.7)
EOSINOPHIL NFR BLD AUTO: 2.3 % — SIGNIFICANT CHANGE UP (ref 0–8)
GLUCOSE SERPL-MCNC: 122 MG/DL — HIGH (ref 70–99)
HCT VFR BLD CALC: 42.9 % — SIGNIFICANT CHANGE UP (ref 42–52)
HGB BLD-MCNC: 14.7 G/DL — SIGNIFICANT CHANGE UP (ref 14–18)
IMM GRANULOCYTES NFR BLD AUTO: 0.3 % — SIGNIFICANT CHANGE UP (ref 0.1–0.3)
LIDOCAIN IGE QN: 33 U/L — SIGNIFICANT CHANGE UP (ref 7–60)
LYMPHOCYTES # BLD AUTO: 1.6 K/UL — SIGNIFICANT CHANGE UP (ref 1.2–3.4)
LYMPHOCYTES # BLD AUTO: 22.5 % — SIGNIFICANT CHANGE UP (ref 20.5–51.1)
MCHC RBC-ENTMCNC: 31.1 PG — HIGH (ref 27–31)
MCHC RBC-ENTMCNC: 34.3 G/DL — SIGNIFICANT CHANGE UP (ref 32–37)
MCV RBC AUTO: 90.9 FL — SIGNIFICANT CHANGE UP (ref 80–94)
MONOCYTES # BLD AUTO: 0.47 K/UL — SIGNIFICANT CHANGE UP (ref 0.1–0.6)
MONOCYTES NFR BLD AUTO: 6.6 % — SIGNIFICANT CHANGE UP (ref 1.7–9.3)
NEUTROPHILS # BLD AUTO: 4.82 K/UL — SIGNIFICANT CHANGE UP (ref 1.4–6.5)
NEUTROPHILS NFR BLD AUTO: 67.7 % — SIGNIFICANT CHANGE UP (ref 42.2–75.2)
NRBC # BLD: 0 /100 WBCS — SIGNIFICANT CHANGE UP (ref 0–0)
PLATELET # BLD AUTO: 224 K/UL — SIGNIFICANT CHANGE UP (ref 130–400)
POTASSIUM SERPL-MCNC: 4.3 MMOL/L — SIGNIFICANT CHANGE UP (ref 3.5–5)
POTASSIUM SERPL-SCNC: 4.3 MMOL/L — SIGNIFICANT CHANGE UP (ref 3.5–5)
PROT SERPL-MCNC: 7.4 G/DL — SIGNIFICANT CHANGE UP (ref 6–8)
RBC # BLD: 4.72 M/UL — SIGNIFICANT CHANGE UP (ref 4.7–6.1)
RBC # FLD: 12 % — SIGNIFICANT CHANGE UP (ref 11.5–14.5)
SODIUM SERPL-SCNC: 138 MMOL/L — SIGNIFICANT CHANGE UP (ref 135–146)
WBC # BLD: 7.11 K/UL — SIGNIFICANT CHANGE UP (ref 4.8–10.8)
WBC # FLD AUTO: 7.11 K/UL — SIGNIFICANT CHANGE UP (ref 4.8–10.8)

## 2023-01-03 PROCEDURE — 99284 EMERGENCY DEPT VISIT MOD MDM: CPT

## 2023-01-03 PROCEDURE — 74177 CT ABD & PELVIS W/CONTRAST: CPT | Mod: 26,MA

## 2023-01-03 RX ORDER — SODIUM CHLORIDE 9 MG/ML
1000 INJECTION INTRAMUSCULAR; INTRAVENOUS; SUBCUTANEOUS ONCE
Refills: 0 | Status: COMPLETED | OUTPATIENT
Start: 2023-01-03 | End: 2023-01-03

## 2023-01-03 RX ORDER — KETOROLAC TROMETHAMINE 30 MG/ML
15 SYRINGE (ML) INJECTION ONCE
Refills: 0 | Status: DISCONTINUED | OUTPATIENT
Start: 2023-01-03 | End: 2023-01-03

## 2023-01-03 RX ORDER — PANTOPRAZOLE SODIUM 20 MG/1
1 TABLET, DELAYED RELEASE ORAL
Qty: 30 | Refills: 0
Start: 2023-01-03 | End: 2023-02-01

## 2023-01-03 RX ORDER — FAMOTIDINE 10 MG/ML
20 INJECTION INTRAVENOUS ONCE
Refills: 0 | Status: COMPLETED | OUTPATIENT
Start: 2023-01-03 | End: 2023-01-03

## 2023-01-03 RX ADMIN — FAMOTIDINE 20 MILLIGRAM(S): 10 INJECTION INTRAVENOUS at 06:00

## 2023-01-03 RX ADMIN — Medication 15 MILLIGRAM(S): at 06:01

## 2023-01-03 RX ADMIN — SODIUM CHLORIDE 1000 MILLILITER(S): 9 INJECTION INTRAMUSCULAR; INTRAVENOUS; SUBCUTANEOUS at 06:00

## 2023-01-03 NOTE — ED PROVIDER NOTE - NS ED ROS FT
Constitutional: no fever, chills, no recent weight loss, change in appetite or malaise  Eyes: no redness/discharge/pain/vision changes  ENT: no rhinorrhea/ear pain/sore throat  Cardiac: No chest pain, SOB or edema.  Respiratory: No cough or respiratory distress  GI: See HPI  : No dysuria, frequency, urgency or hematuria  MS: no pain to back or extremities, no loss of ROM, no weakness  Neuro: No headache or weakness. No LOC.  Skin: No skin rash.  Endocrine: No history of thyroid disease or diabetes.

## 2023-01-03 NOTE — ED PROVIDER NOTE - PROGRESS NOTE DETAILS
Pain improved.  Follow GI as outpatient.  EGD reports from February reviewed and patient has history of chronic gastritis.

## 2023-01-03 NOTE — ED PROVIDER NOTE - PATIENT PORTAL LINK FT
You can access the FollowMyHealth Patient Portal offered by [facility] by registering at the following website: [email or web address]. By joining [employer]’s FollowMyHealth portal, you will also be able to view your health information using other applications (apps) compatible with our system.

## 2023-01-03 NOTE — ED PROVIDER NOTE - PHYSICAL EXAMINATION
CONSTITUTIONAL: Well-appearing; well-nourished; in no apparent distress.   EYES: PERRL; EOM intact.   CARDIOVASCULAR: Normal S1, S2; no murmurs, rubs, or gallops.   RESPIRATORY: Normal chest excursion with respiration; breath sounds clear and equal bilaterally; no wheezes, rhonchi, or rales.  GI/: Mild epigastric tenderness.  No rebound or guarding.  Soft abdomen.  Normal bowel sounds; non-distended; no palpable organomegaly.   MS: No calf swelling and tenderness.  SKIN: Normal for age and race; warm; dry; good turgor; no apparent lesions or exudate.   NEURO/PSYCH: A & O x 4; grossly unremarkable.

## 2023-01-03 NOTE — ED PROVIDER NOTE - NSFOLLOWUPCLINICS_GEN_ALL_ED_FT
[facility]  Gastroenterology  [address] [place] [address]  Phone: [phone]  Fax: [phone]

## 2023-01-03 NOTE — ED PROVIDER NOTE - OBJECTIVE STATEMENT
47 years old male history of GERD present complaint upper abdominal pain rating to his back tonight.  Reports he has some potato and pasta soup this evening and pain started an hour later.  Pain is sharp and last more than an hour which is unusual to him so he comes to ED for evaluation.  Patient otherwise denies fever/chill/nausea/vomiting/diarrhea/change of appetite/constipation and urinary sxs. Denies HA/dizziness/chest pain/sob/palpitations and pain to extremities and ambulatory difficulty.

## 2023-01-03 NOTE — ED ADULT TRIAGE NOTE - CHIEF COMPLAINT QUOTE
Pt. complains of diffuse abdominal pain. Pt. denies any nausea vomiting or diarrhea. As per pt. symptoms began tonight

## 2023-01-03 NOTE — ED PROVIDER NOTE - ATTENDING APP SHARED VISIT CONTRIBUTION OF CARE
p-t co ab pain epigastric for 2 days. no n, v, d, fever or chills.     pt in nad, anict, ctab, rrr, ab soft, mild epig tender. no rebound or guarding. no cvat.

## 2023-02-10 ENCOUNTER — EMERGENCY (EMERGENCY)
Facility: HOSPITAL | Age: 48
LOS: 0 days | Discharge: HOME | End: 2023-02-11
Attending: EMERGENCY MEDICINE
Payer: MEDICAID

## 2023-02-10 VITALS
RESPIRATION RATE: 20 BRPM | TEMPERATURE: 99 F | DIASTOLIC BLOOD PRESSURE: 80 MMHG | WEIGHT: 169.98 LBS | SYSTOLIC BLOOD PRESSURE: 165 MMHG | OXYGEN SATURATION: 100 % | HEIGHT: 65 IN | HEART RATE: 62 BPM

## 2023-02-10 DIAGNOSIS — R07.9 CHEST PAIN, UNSPECIFIED: ICD-10-CM

## 2023-02-10 DIAGNOSIS — R07.89 OTHER CHEST PAIN: ICD-10-CM

## 2023-02-10 DIAGNOSIS — Z20.822 CONTACT WITH AND (SUSPECTED) EXPOSURE TO COVID-19: ICD-10-CM

## 2023-02-10 DIAGNOSIS — K21.9 GASTRO-ESOPHAGEAL REFLUX DISEASE WITHOUT ESOPHAGITIS: ICD-10-CM

## 2023-02-10 PROCEDURE — 93010 ELECTROCARDIOGRAM REPORT: CPT

## 2023-02-10 PROCEDURE — 93005 ELECTROCARDIOGRAM TRACING: CPT

## 2023-02-10 PROCEDURE — 83735 ASSAY OF MAGNESIUM: CPT

## 2023-02-10 PROCEDURE — 85025 COMPLETE CBC W/AUTO DIFF WBC: CPT

## 2023-02-10 PROCEDURE — 80053 COMPREHEN METABOLIC PANEL: CPT

## 2023-02-10 PROCEDURE — 99285 EMERGENCY DEPT VISIT HI MDM: CPT

## 2023-02-10 PROCEDURE — 83690 ASSAY OF LIPASE: CPT

## 2023-02-10 PROCEDURE — 36415 COLL VENOUS BLD VENIPUNCTURE: CPT

## 2023-02-10 PROCEDURE — 87635 SARS-COV-2 COVID-19 AMP PRB: CPT

## 2023-02-10 PROCEDURE — 84484 ASSAY OF TROPONIN QUANT: CPT

## 2023-02-10 PROCEDURE — 71045 X-RAY EXAM CHEST 1 VIEW: CPT | Mod: 26

## 2023-02-10 PROCEDURE — 71045 X-RAY EXAM CHEST 1 VIEW: CPT

## 2023-02-10 PROCEDURE — G0378: CPT

## 2023-02-10 PROCEDURE — 99285 EMERGENCY DEPT VISIT HI MDM: CPT | Mod: 25

## 2023-02-10 PROCEDURE — 96374 THER/PROPH/DIAG INJ IV PUSH: CPT

## 2023-02-10 PROCEDURE — 75574 CT ANGIO HRT W/3D IMAGE: CPT | Mod: MA

## 2023-02-10 RX ORDER — FAMOTIDINE 10 MG/ML
20 INJECTION INTRAVENOUS ONCE
Refills: 0 | Status: COMPLETED | OUTPATIENT
Start: 2023-02-10 | End: 2023-02-10

## 2023-02-10 RX ADMIN — FAMOTIDINE 20 MILLIGRAM(S): 10 INJECTION INTRAVENOUS at 23:00

## 2023-02-10 NOTE — ED PROVIDER NOTE - CLINICAL SUMMARY MEDICAL DECISION MAKING FREE TEXT BOX
Patient presented with complaints of chest pain.  Required EKG, labs, imaging.  Will be placed in ED OU for further work-up.

## 2023-02-10 NOTE — ED ADULT NURSE NOTE - NSIMPLEMENTINTERV_GEN_ALL_ED
Implemented All Universal Safety Interventions:  [place] to call system. Call bell, personal items and telephone within reach. Instruct patient to call for assistance. Room bathroom lighting operational. Non-slip footwear when patient is off stretcher. Physically safe environment: no spills, clutter or unnecessary equipment. Stretcher in lowest position, wheels locked, appropriate side rails in place.

## 2023-02-10 NOTE — ED PROVIDER NOTE - OBJECTIVE STATEMENT
48 year old male, past medical history gerd, who presents with chest pain. patient with central, non-radiating chest pain x1 week, no aggravating/alleviating symptoms. denies fever, chills, shortness of breath, back pain, hemoptysis, nausea/vomiting/diarrhea. non-smoker, no family hx cad. hx stress test ~1 year ago. 48 year old male, past medical history gerd, who presents with chest pain. patient with central, non-radiating chest pain x1 week, no aggravating/alleviating symptoms. denies fever, chills, shortness of breath, back pain, hemoptysis, nausea/vomiting/diarrhea. non-smoker, no family hx cad. hx stress test and endoscopy ~1 year ago.

## 2023-02-10 NOTE — ED ADULT NURSE NOTE - OBJECTIVE STATEMENT
Pt here complaining of intermittent chest pain x1 week that has become persistent and worsened today.  Per pt, pain radiates down both arms and to jaw.  Denies SOB/N/V/D.  No acute distress noted at this time.

## 2023-02-10 NOTE — ED PROVIDER NOTE - ATTENDING CONTRIBUTION TO CARE
I personally evaluated the patient. I reviewed the Resident’s or Physician Assistant’s note (as assigned above), and agree with the findings and plan except as documented in my note.  48-year-old male with past medical history of GERD, presents with complaints of 1 week of left-sided chest pain that worsened in the last 2 days.  Denies associated shortness of breath, lightheadedness, coughing, fever, abdominal complaints.  VSS, non toxic appearing, NAD, Head NCAT, MMM, neck supple, normal ROM, normal s1s2, lungs ctab, abd s/nt/nd, no guarding or rebound, extremities wnl, AAO x 3, GCS 15, neuro grossly normal. No acute skin lesions. Plan is EKG, labs, imaging, meds as needed and reassess.

## 2023-02-11 VITALS
DIASTOLIC BLOOD PRESSURE: 66 MMHG | TEMPERATURE: 97 F | SYSTOLIC BLOOD PRESSURE: 121 MMHG | RESPIRATION RATE: 18 BRPM | OXYGEN SATURATION: 98 % | HEART RATE: 59 BPM

## 2023-02-11 LAB
ALBUMIN SERPL ELPH-MCNC: 4.3 G/DL — SIGNIFICANT CHANGE UP (ref 3.5–5.2)
ALP SERPL-CCNC: 108 U/L — SIGNIFICANT CHANGE UP (ref 30–115)
ALT FLD-CCNC: 33 U/L — SIGNIFICANT CHANGE UP (ref 0–41)
ANION GAP SERPL CALC-SCNC: 10 MMOL/L — SIGNIFICANT CHANGE UP (ref 7–14)
AST SERPL-CCNC: 21 U/L — SIGNIFICANT CHANGE UP (ref 0–41)
BASOPHILS # BLD AUTO: 0.03 K/UL — SIGNIFICANT CHANGE UP (ref 0–0.2)
BASOPHILS NFR BLD AUTO: 0.5 % — SIGNIFICANT CHANGE UP (ref 0–1)
BILIRUB SERPL-MCNC: 0.3 MG/DL — SIGNIFICANT CHANGE UP (ref 0.2–1.2)
BUN SERPL-MCNC: 14 MG/DL — SIGNIFICANT CHANGE UP (ref 10–20)
CALCIUM SERPL-MCNC: 9.2 MG/DL — SIGNIFICANT CHANGE UP (ref 8.4–10.5)
CHLORIDE SERPL-SCNC: 103 MMOL/L — SIGNIFICANT CHANGE UP (ref 98–110)
CO2 SERPL-SCNC: 26 MMOL/L — SIGNIFICANT CHANGE UP (ref 17–32)
CREAT SERPL-MCNC: 0.8 MG/DL — SIGNIFICANT CHANGE UP (ref 0.7–1.5)
EGFR: 108 ML/MIN/1.73M2 — SIGNIFICANT CHANGE UP
EOSINOPHIL # BLD AUTO: 0.2 K/UL — SIGNIFICANT CHANGE UP (ref 0–0.7)
EOSINOPHIL NFR BLD AUTO: 3.6 % — SIGNIFICANT CHANGE UP (ref 0–8)
GLUCOSE SERPL-MCNC: 95 MG/DL — SIGNIFICANT CHANGE UP (ref 70–99)
HCT VFR BLD CALC: 42.5 % — SIGNIFICANT CHANGE UP (ref 42–52)
HGB BLD-MCNC: 14.9 G/DL — SIGNIFICANT CHANGE UP (ref 14–18)
IMM GRANULOCYTES NFR BLD AUTO: 0.2 % — SIGNIFICANT CHANGE UP (ref 0.1–0.3)
LIDOCAIN IGE QN: 30 U/L — SIGNIFICANT CHANGE UP (ref 7–60)
LYMPHOCYTES # BLD AUTO: 2.07 K/UL — SIGNIFICANT CHANGE UP (ref 1.2–3.4)
LYMPHOCYTES # BLD AUTO: 37.7 % — SIGNIFICANT CHANGE UP (ref 20.5–51.1)
MAGNESIUM SERPL-MCNC: 2.2 MG/DL — SIGNIFICANT CHANGE UP (ref 1.8–2.4)
MCHC RBC-ENTMCNC: 31.8 PG — HIGH (ref 27–31)
MCHC RBC-ENTMCNC: 35.1 G/DL — SIGNIFICANT CHANGE UP (ref 32–37)
MCV RBC AUTO: 90.8 FL — SIGNIFICANT CHANGE UP (ref 80–94)
MONOCYTES # BLD AUTO: 0.42 K/UL — SIGNIFICANT CHANGE UP (ref 0.1–0.6)
MONOCYTES NFR BLD AUTO: 7.7 % — SIGNIFICANT CHANGE UP (ref 1.7–9.3)
NEUTROPHILS # BLD AUTO: 2.76 K/UL — SIGNIFICANT CHANGE UP (ref 1.4–6.5)
NEUTROPHILS NFR BLD AUTO: 50.3 % — SIGNIFICANT CHANGE UP (ref 42.2–75.2)
NRBC # BLD: 0 /100 WBCS — SIGNIFICANT CHANGE UP (ref 0–0)
PLATELET # BLD AUTO: 219 K/UL — SIGNIFICANT CHANGE UP (ref 130–400)
POTASSIUM SERPL-MCNC: 3.9 MMOL/L — SIGNIFICANT CHANGE UP (ref 3.5–5)
POTASSIUM SERPL-SCNC: 3.9 MMOL/L — SIGNIFICANT CHANGE UP (ref 3.5–5)
PROT SERPL-MCNC: 6.9 G/DL — SIGNIFICANT CHANGE UP (ref 6–8)
RBC # BLD: 4.68 M/UL — LOW (ref 4.7–6.1)
RBC # FLD: 11.9 % — SIGNIFICANT CHANGE UP (ref 11.5–14.5)
SARS-COV-2 RNA SPEC QL NAA+PROBE: SIGNIFICANT CHANGE UP
SODIUM SERPL-SCNC: 139 MMOL/L — SIGNIFICANT CHANGE UP (ref 135–146)
TROPONIN T SERPL-MCNC: <0.01 NG/ML — SIGNIFICANT CHANGE UP
TROPONIN T SERPL-MCNC: <0.01 NG/ML — SIGNIFICANT CHANGE UP
WBC # BLD: 5.49 K/UL — SIGNIFICANT CHANGE UP (ref 4.8–10.8)
WBC # FLD AUTO: 5.49 K/UL — SIGNIFICANT CHANGE UP (ref 4.8–10.8)

## 2023-02-11 PROCEDURE — 93010 ELECTROCARDIOGRAM REPORT: CPT | Mod: 77

## 2023-02-11 PROCEDURE — 99236 HOSP IP/OBS SAME DATE HI 85: CPT

## 2023-02-11 PROCEDURE — 93010 ELECTROCARDIOGRAM REPORT: CPT

## 2023-02-11 PROCEDURE — 75574 CT ANGIO HRT W/3D IMAGE: CPT | Mod: 26,MA

## 2023-02-11 RX ORDER — ASPIRIN/CALCIUM CARB/MAGNESIUM 324 MG
325 TABLET ORAL ONCE
Refills: 0 | Status: COMPLETED | OUTPATIENT
Start: 2023-02-11 | End: 2023-02-11

## 2023-02-11 RX ADMIN — Medication 325 MILLIGRAM(S): at 02:20

## 2023-02-11 NOTE — ED CDU PROVIDER INITIAL DAY NOTE - EKG ADDITIONAL QUESTION - PERFORMED INDEPENDENT VISUALIZATION
n/a/Pt was following a Consistent Carbohydrate diet PTA and took insulin before meals and at bedtime. Confirms NKFA. Took a prenatal MVI PTA.
Yes

## 2023-02-11 NOTE — ED CDU PROVIDER INITIAL DAY NOTE - OBJECTIVE STATEMENT
48 year old male, past medical history gerd, who presents with chest pain. patient with central, non-radiating chest pain x1 week, no aggravating/alleviating symptoms. denies fever, chills, shortness of breath, back pain, hemoptysis, nausea/vomiting/diarrhea. non-smoker, no family hx cad. hx stress test and endoscopy ~1 year ago.

## 2023-02-11 NOTE — ED CDU PROVIDER INITIAL DAY NOTE - ATTENDING APP SHARED VISIT CONTRIBUTION OF CARE
48-year-old man, history of GERD, but no cardiac risk factors was placed in CDU for work-up of chest pain.  ED evaluation was unremarkable, plan is for telemetry, serial EKG and enzymes, CCTA.  Vital signs, exam as noted, patient is nontoxic appearing and comfortable on my eval, and amenable to plan.

## 2023-02-11 NOTE — ED CDU PROVIDER INITIAL DAY NOTE - CONSIDERATION OF ADMISSION OBSERVATION
Pt requires telemetry, serial EKG/enzymes, advanced cardiac imaging. Consideration of Admission/Observation

## 2023-02-11 NOTE — ED CDU PROVIDER DISPOSITION NOTE - CLINICAL COURSE
48-year-old man, history of GERD, but no cardiac risk factors was placed in CDU for work-up of chest pain.  ED evaluation was unremarkable, plan is for telemetry, serial EKG and enzymes, CCTA.  Vital signs, exam as noted, patient is nontoxic appearing and comfortable on my eval. He was monitored without incident, repeat EKG and enzymes unchanged.  CCTA was CAD-RAD 0 for normal coronary arteries.  All results were discussed with patient and he was discharged home to follow-up with PMD.

## 2023-02-11 NOTE — ED CDU PROVIDER DISPOSITION NOTE - PROVIDER TOKENS
PROVIDER:[TOKEN:[[id number]],FOLLOWUP:[4-6 Days],ESTABLISHEDPATIENT:[T]],PROVIDER:[TOKEN:[[id number]],FOLLOWUP:[4-6 Days]]

## 2023-02-11 NOTE — ED CDU PROVIDER INITIAL DAY NOTE - PHYSICAL EXAMINATION
Name:  Trevor Irwin School Year:  8th Grade Class: Student ID No.:   Address:  [address] [id number] Phone:  [phone] (home)  :  13year old   Name Relationship Lgl [address] Work Phone Home Phone Mobile Phone   1.  Damián Barron implanted defibrillator? 12. Has anyone in your family had unexplained fainting, seizures, or near drowning?      BONE AND JOINT QUESTIONS Yes No   17. Have you ever had an injury to a bone, muscle, ligament, or tendon that caused you to miss a practice 39.Have you ever been unable to move your arms / legs after being hit /fall? 40. Have you ever become ill while exercising in the heat?     41. Do you get frequent muscle cramps when exercising? 42.  Do you or someone in your family have sickle cell · Location of point of maximal impulse (PMI) Yes    Pulses Yes    Lungs Yes    Abdomen Yes    Genitourinary (males only)* Yes    Skin:  HSV, lesions suggestive of MRSA, tinea corporis Yes    Neurologic* Yes    MUSCULOSKELETAL     Neck Yes    Back Yes    Sh performance-enhancing substances in my/his/her body either during [employer] state series events or during the school day, and I/our student do/does hereby agree to submit to such testing and analysis by a certified laboratory.  We further understand and agree th CONSTITUTIONAL: Well-developed; well-nourished; in no acute distress, nontoxic appearing  SKIN: skin exam is warm and dry  ENT: MMM   CARD: S1, S2 normal, no murmur  RESP: No wheezes, rales or rhonchi. Good air movement bilaterally  ABD: soft; non-distended; non-tender.    EXT: Normal ROM.    NEURO: awake, alert, following commands, oriented, grossly unremarkable. No Focal deficits. GCS 15.   PSYCH: Cooperative, appropriate.

## 2023-02-11 NOTE — ED CDU PROVIDER DISPOSITION NOTE - PATIENT PORTAL LINK FT
You can access the FollowMyHealth Patient Portal offered by [facility] by registering at the following website: [email or web address]. By joining [facility]’s FollowMyHealth portal, you will also be able to view your health information using other applications (apps) compatible with our system.

## 2023-02-11 NOTE — ED CDU PROVIDER INITIAL DAY NOTE - WR ORDER STATUS 1
Patient Seen in: [facility]      History   Patient presents with:  Arm Pain    Stated Complaint: WC Left hand/arm injury     HPI/Subjective:   HPI  12-year-old transgendered male presents to immediate care complaining of left arm pain aft Pulse 83   Resp 16   Temp 97.6 °F (36.4 °C)   Temp src Temporal   SpO2 99 %   O2 Device None (Room air)       Current:/69   Pulse 83   Temp 97.6 °F (36.4 °C) (Temporal)   Resp 16   Ht 175.3 cm (5' 9\")   Wt 94.3 kg   LMP 01/10/2021   SpO2 99%   BMI MD James on 2/04/2021 at 8:42 AM       Xr Cervical Spine (4views) (cpt=72050)    Result Date: 2/16/2021  PROCEDURE:  XR CERVICAL SPINE (4VIEWS) (CPT=72050)  TECHNIQUE:  AP, lateral, obliques, and coned down view of the spine were obtained.   COMPARISON:  No (CST): Sherin Fernandez MD on 3/03/2021 at 8:16 PM       Xr Wrist Complete (min 3 Views), Left (cpt=73110)    Result Date: 3/3/2021  PROCEDURE:  XR WRIST COMPLETE (MIN 3 VIEWS), LEFT (CPT=73110)  TECHNIQUE:  Three views were obtained. COMPARISON:  None. Performed Resulted

## 2023-02-11 NOTE — ED CDU PROVIDER DISPOSITION NOTE - CARE PROVIDER_API CALL
PAM HERNADEZ  Family Medicine  [address] [place] [address]  Phone: [phone]  Fax: [phone]  Established Patient  Follow Up Time: 4-6 Days    Theo Ravi)  Cardiovascular Disease; Interventional Cardiology  [address] [place] [address] [address]  [place] [address]  Phone: [phone]  Fax: [phone]  Follow Up Time: 4-6 Days

## 2023-03-20 ENCOUNTER — APPOINTMENT (OUTPATIENT)
Dept: GASTROENTEROLOGY | Facility: CLINIC | Age: 48
End: 2023-03-20
Payer: MEDICAID

## 2023-03-20 VITALS
WEIGHT: 177 LBS | DIASTOLIC BLOOD PRESSURE: 82 MMHG | HEART RATE: 62 BPM | HEIGHT: 61 IN | BODY MASS INDEX: 33.42 KG/M2 | SYSTOLIC BLOOD PRESSURE: 142 MMHG | OXYGEN SATURATION: 98 %

## 2023-03-20 DIAGNOSIS — R12 HEARTBURN: ICD-10-CM

## 2023-03-20 DIAGNOSIS — K63.5 POLYP OF COLON: ICD-10-CM

## 2023-03-20 DIAGNOSIS — R10.9 UNSPECIFIED ABDOMINAL PAIN: ICD-10-CM

## 2023-03-20 DIAGNOSIS — Z87.19 PERSONAL HISTORY OF OTHER DISEASES OF THE DIGESTIVE SYSTEM: ICD-10-CM

## 2023-03-20 PROCEDURE — 99213 OFFICE O/P EST LOW 20 MIN: CPT

## 2023-03-20 RX ORDER — POLYETHYLENE GLYCOL 3350 AND ELECTROLYTES WITH LEMON FLAVOR 236; 22.74; 6.74; 5.86; 2.97 G/4L; G/4L; G/4L; G/4L; G/4L
236 POWDER, FOR SOLUTION ORAL
Qty: 1 | Refills: 0 | Status: ACTIVE | COMMUNITY
Start: 2023-03-20 | End: 1900-01-01

## 2023-03-20 RX ORDER — BISACODYL 5 MG/1
5 TABLET, DELAYED RELEASE ORAL
Qty: 4 | Refills: 0 | Status: ACTIVE | COMMUNITY
Start: 2023-03-20 | End: 1900-01-01

## 2023-03-20 RX ORDER — PANTOPRAZOLE 40 MG/1
40 TABLET, DELAYED RELEASE ORAL
Qty: 30 | Refills: 4 | Status: ACTIVE | COMMUNITY
Start: 2023-03-20 | End: 1900-01-01

## 2023-03-20 RX ORDER — PANTOPRAZOLE 40 MG/1
40 TABLET, DELAYED RELEASE ORAL TWICE DAILY
Qty: 60 | Refills: 2 | Status: DISCONTINUED | COMMUNITY
Start: 2022-01-21 | End: 2023-03-20

## 2023-03-20 NOTE — HISTORY OF PRESENT ILLNESS
[FreeTextEntry1] : LAST VISIT - 10/24/22 [de-identified] : 49yo male presents for follow up of abdominal pain and bloating previously seen by Dr. Dorsey.\par \par Pt previously seen by Dr. Dorsey in 3/2022 with intermittent chest and epigastric pain s/p EGD showing mild chronic gastritis/duodenitis, no evidence of H pylori or celiac disease. Due to h/o gallstones pt was advised surgery evaluation however pt did not follow up.\par Last US in 1/2022 did not show gallstones, however prior US in 2020 did show gallstones.\par Repeat US in 9/2022 showing gallstones, hepatic steatosis, nonobstructing right renal stone.\par \par Pt last seen in 6/2022, pt missed interval appts. He reports recurrent epigastric and RUQ pain with intermittent heartburn and bloating. Occasional nausea, denies vomiting. Notes worsening with heavy or fatty foods. Reports regular bm 1-2x daily occasionally looser stools, no blood. Previously advised pepcid, pt not taking recently. Denies NSAID use. Appetite good, denies weight loss. Recently seen in ED in 2/2023 with chest pain with negative cardiac workup. \par \par \par Reports colonoscopy performed in [place] in 11/2020 revealing three tubular adenomas per report\par No known family h/o colon ca\par \par Labs reviewed (2/2023):\par Hb 14.9, mcv normal\par LFTs normal\par

## 2023-03-20 NOTE — ASSESSMENT
[FreeTextEntry1] : 49yo male h/o cholelithiasis presents for follow up of intermittent epigastric and RUQ abdominal pain and bloating previously seen by Dr. Dorsey s/p EGD in 2/2022 showing mild chronic gastritis/duodenitis, no evidence of H pylori or celiac disease. Possibly GERD/gastritis vs biliary colic/symptomatic gallstone disease.\par \par 1. Abdominal pain\par 2. Gallstones\par 3. Colon polyps, last colonoscopy in 2020\par 4. Hepatic steatosis, normal LFTs\par \par -Recommend refer to surgery for possible lap henry\par -Resume pantoprazole 40mg daily taken 20-30 minutes before breakfast\par -Antireflux measures discussed including elevating hob and to avoid lying down for 2-3 hours after meals \par -Discussed avoidance of dietary triggers including spicy foods, coffee, etoh, citrus, tomatoes, chocolate, mints\par -Discussed avoidance of greasy, fatty foods\par -Continue NSAID avoidance\par -Schedule surveillance colonoscopy due to h/o tubular adenomas\par -Consider fibroscan at next visit\par -Continue dietary and lifestyle modifications\par \par Follow up 2 months\par Pt agreeable with plan, advised to contact us if questions/concerns or if new/worsening symptoms\par

## 2023-03-20 NOTE — PHYSICAL EXAM
[General Appearance - Alert] : alert [General Appearance - In No Acute Distress] : in no acute distress [General Appearance - Well Nourished] : well nourished [General Appearance - Well-Appearing] : healthy appearing [Sclera] : the sclera and conjunctiva were normal [Hearing Threshold Finger Rub Not [place]] : hearing was normal [] : no respiratory distress [Respiration, Rhythm And Depth] : normal respiratory rhythm and effort [Exaggerated Use Of Accessory Muscles For Inspiration] : no accessory muscle use [Heart Sounds] : normal S1 and S2 [Bowel Sounds] : normal bowel sounds [Alert] : alert [Healthy Appearing] : healthy appearing [Well Developed] : well developed [No Respiratory Distress] : no respiratory distress [No Acc Muscle Use] : no accessory muscle use [Abdomen Tenderness] : non-tender [Abdomen Soft] : soft [Abnormal Walk] : normal gait [Rebound Tenderness] : no rebound tenderness [de-identified] : abd soft, nt, nd

## 2023-03-20 NOTE — REVIEW OF SYSTEMS
[As Noted in HPI] : as noted in HPI [Abdominal Pain] : abdominal pain [Heartburn] : heartburn [Bloating (gassiness)] : bloating [Negative] : Neurological [Lower Ext Edema (lower leg swelling)] : no lower extremity edema [Bleeding] : no bleeding [Fever] : no fever [Chills] : no chills [Feeling Tired] : not feeling tired [Recent Weight Loss (___ Lbs)] : no recent weight loss [Sore Throat] : no sore throat [Hoarseness] : no hoarseness [Chest Pain] : no chest pain [Palpitations] : no palpitations [Lower Ext Edema] : no extremity edema [Shortness Of Breath] : no shortness of breath [Cough] : no cough [SOB on Exertion] : no shortness of breath during exertion [Vomiting] : no vomiting [Constipation] : no constipation [Diarrhea] : no diarrhea [Melena] : no melena [Dysuria] : no dysuria [Nocturia] : no nocturia

## 2023-04-02 ENCOUNTER — EMERGENCY (EMERGENCY)
Facility: HOSPITAL | Age: 48
LOS: 0 days | Discharge: HOME | End: 2023-04-02
Attending: EMERGENCY MEDICINE
Payer: MEDICAID

## 2023-04-02 VITALS
HEIGHT: 65 IN | WEIGHT: 220.02 LBS | OXYGEN SATURATION: 100 % | DIASTOLIC BLOOD PRESSURE: 76 MMHG | RESPIRATION RATE: 18 BRPM | HEART RATE: 75 BPM | SYSTOLIC BLOOD PRESSURE: 158 MMHG | TEMPERATURE: 99 F

## 2023-04-02 VITALS
RESPIRATION RATE: 18 BRPM | DIASTOLIC BLOOD PRESSURE: 83 MMHG | SYSTOLIC BLOOD PRESSURE: 132 MMHG | OXYGEN SATURATION: 100 % | TEMPERATURE: 97 F | HEART RATE: 84 BPM

## 2023-04-02 DIAGNOSIS — R10.11 RIGHT UPPER QUADRANT PAIN: ICD-10-CM

## 2023-04-02 DIAGNOSIS — K80.20 CALCULUS OF GALLBLADDER WITHOUT CHOLECYSTITIS WITHOUT OBSTRUCTION: ICD-10-CM

## 2023-04-02 DIAGNOSIS — Z87.19 PERSONAL HISTORY OF OTHER DISEASES OF THE DIGESTIVE SYSTEM: ICD-10-CM

## 2023-04-02 DIAGNOSIS — R10.13 EPIGASTRIC PAIN: ICD-10-CM

## 2023-04-02 DIAGNOSIS — K21.9 GASTRO-ESOPHAGEAL REFLUX DISEASE WITHOUT ESOPHAGITIS: ICD-10-CM

## 2023-04-02 LAB
ALBUMIN SERPL ELPH-MCNC: 4.7 G/DL — SIGNIFICANT CHANGE UP (ref 3.5–5.2)
ALBUMIN SERPL ELPH-MCNC: 4.7 G/DL — SIGNIFICANT CHANGE UP (ref 3.5–5.2)
ALP SERPL-CCNC: 105 U/L — SIGNIFICANT CHANGE UP (ref 30–115)
ALP SERPL-CCNC: 112 U/L — SIGNIFICANT CHANGE UP (ref 30–115)
ALT FLD-CCNC: 36 U/L — SIGNIFICANT CHANGE UP (ref 0–41)
ALT FLD-CCNC: 36 U/L — SIGNIFICANT CHANGE UP (ref 0–41)
ANION GAP SERPL CALC-SCNC: 16 MMOL/L — HIGH (ref 7–14)
AST SERPL-CCNC: 22 U/L — SIGNIFICANT CHANGE UP (ref 0–41)
AST SERPL-CCNC: 23 U/L — SIGNIFICANT CHANGE UP (ref 0–41)
BASOPHILS # BLD AUTO: 0.02 K/UL — SIGNIFICANT CHANGE UP (ref 0–0.2)
BASOPHILS NFR BLD AUTO: 0.2 % — SIGNIFICANT CHANGE UP (ref 0–1)
BILIRUB DIRECT SERPL-MCNC: <0.2 MG/DL — SIGNIFICANT CHANGE UP (ref 0–0.3)
BILIRUB INDIRECT FLD-MCNC: >0.4 MG/DL — SIGNIFICANT CHANGE UP (ref 0.2–1.2)
BILIRUB SERPL-MCNC: 0.6 MG/DL — SIGNIFICANT CHANGE UP (ref 0.2–1.2)
BILIRUB SERPL-MCNC: 0.6 MG/DL — SIGNIFICANT CHANGE UP (ref 0.2–1.2)
BUN SERPL-MCNC: 19 MG/DL — SIGNIFICANT CHANGE UP (ref 10–20)
CALCIUM SERPL-MCNC: 9.6 MG/DL — SIGNIFICANT CHANGE UP (ref 8.4–10.5)
CHLORIDE SERPL-SCNC: 99 MMOL/L — SIGNIFICANT CHANGE UP (ref 98–110)
CO2 SERPL-SCNC: 22 MMOL/L — SIGNIFICANT CHANGE UP (ref 17–32)
CREAT SERPL-MCNC: 0.8 MG/DL — SIGNIFICANT CHANGE UP (ref 0.7–1.5)
EGFR: 108 ML/MIN/1.73M2 — SIGNIFICANT CHANGE UP
EOSINOPHIL # BLD AUTO: 0.02 K/UL — SIGNIFICANT CHANGE UP (ref 0–0.7)
EOSINOPHIL NFR BLD AUTO: 0.2 % — SIGNIFICANT CHANGE UP (ref 0–8)
GLUCOSE SERPL-MCNC: 142 MG/DL — HIGH (ref 70–99)
HCT VFR BLD CALC: 43.4 % — SIGNIFICANT CHANGE UP (ref 42–52)
HGB BLD-MCNC: 14.8 G/DL — SIGNIFICANT CHANGE UP (ref 14–18)
IMM GRANULOCYTES NFR BLD AUTO: 0.4 % — HIGH (ref 0.1–0.3)
LACTATE SERPL-SCNC: 2.6 MMOL/L — HIGH (ref 0.7–2)
LIDOCAIN IGE QN: 28 U/L — SIGNIFICANT CHANGE UP (ref 7–60)
LYMPHOCYTES # BLD AUTO: 1.24 K/UL — SIGNIFICANT CHANGE UP (ref 1.2–3.4)
LYMPHOCYTES # BLD AUTO: 14.7 % — LOW (ref 20.5–51.1)
MCHC RBC-ENTMCNC: 30.8 PG — SIGNIFICANT CHANGE UP (ref 27–31)
MCHC RBC-ENTMCNC: 34.1 G/DL — SIGNIFICANT CHANGE UP (ref 32–37)
MCV RBC AUTO: 90.4 FL — SIGNIFICANT CHANGE UP (ref 80–94)
MONOCYTES # BLD AUTO: 0.28 K/UL — SIGNIFICANT CHANGE UP (ref 0.1–0.6)
MONOCYTES NFR BLD AUTO: 3.3 % — SIGNIFICANT CHANGE UP (ref 1.7–9.3)
NEUTROPHILS # BLD AUTO: 6.82 K/UL — HIGH (ref 1.4–6.5)
NEUTROPHILS NFR BLD AUTO: 81.2 % — HIGH (ref 42.2–75.2)
NRBC # BLD: 0 /100 WBCS — SIGNIFICANT CHANGE UP (ref 0–0)
PLATELET # BLD AUTO: 221 K/UL — SIGNIFICANT CHANGE UP (ref 130–400)
POTASSIUM SERPL-MCNC: 3.6 MMOL/L — SIGNIFICANT CHANGE UP (ref 3.5–5)
POTASSIUM SERPL-SCNC: 3.6 MMOL/L — SIGNIFICANT CHANGE UP (ref 3.5–5)
PROT SERPL-MCNC: 7.6 G/DL — SIGNIFICANT CHANGE UP (ref 6–8)
PROT SERPL-MCNC: 7.6 G/DL — SIGNIFICANT CHANGE UP (ref 6–8)
RBC # BLD: 4.8 M/UL — SIGNIFICANT CHANGE UP (ref 4.7–6.1)
RBC # FLD: 11.9 % — SIGNIFICANT CHANGE UP (ref 11.5–14.5)
SODIUM SERPL-SCNC: 137 MMOL/L — SIGNIFICANT CHANGE UP (ref 135–146)
TROPONIN T SERPL-MCNC: <0.01 NG/ML — SIGNIFICANT CHANGE UP
WBC # BLD: 8.41 K/UL — SIGNIFICANT CHANGE UP (ref 4.8–10.8)
WBC # FLD AUTO: 8.41 K/UL — SIGNIFICANT CHANGE UP (ref 4.8–10.8)

## 2023-04-02 PROCEDURE — 71046 X-RAY EXAM CHEST 2 VIEWS: CPT

## 2023-04-02 PROCEDURE — 84484 ASSAY OF TROPONIN QUANT: CPT

## 2023-04-02 PROCEDURE — 36415 COLL VENOUS BLD VENIPUNCTURE: CPT

## 2023-04-02 PROCEDURE — 93005 ELECTROCARDIOGRAM TRACING: CPT

## 2023-04-02 PROCEDURE — 99283 EMERGENCY DEPT VISIT LOW MDM: CPT | Mod: 24,25

## 2023-04-02 PROCEDURE — 99285 EMERGENCY DEPT VISIT HI MDM: CPT | Mod: 25

## 2023-04-02 PROCEDURE — 93010 ELECTROCARDIOGRAM REPORT: CPT

## 2023-04-02 PROCEDURE — 76705 ECHO EXAM OF ABDOMEN: CPT

## 2023-04-02 PROCEDURE — 80076 HEPATIC FUNCTION PANEL: CPT

## 2023-04-02 PROCEDURE — 96375 TX/PRO/DX INJ NEW DRUG ADDON: CPT

## 2023-04-02 PROCEDURE — 83605 ASSAY OF LACTIC ACID: CPT

## 2023-04-02 PROCEDURE — 99285 EMERGENCY DEPT VISIT HI MDM: CPT

## 2023-04-02 PROCEDURE — 80053 COMPREHEN METABOLIC PANEL: CPT

## 2023-04-02 PROCEDURE — 85025 COMPLETE CBC W/AUTO DIFF WBC: CPT

## 2023-04-02 PROCEDURE — 71046 X-RAY EXAM CHEST 2 VIEWS: CPT | Mod: 26

## 2023-04-02 PROCEDURE — 96376 TX/PRO/DX INJ SAME DRUG ADON: CPT

## 2023-04-02 PROCEDURE — 83690 ASSAY OF LIPASE: CPT

## 2023-04-02 PROCEDURE — 76705 ECHO EXAM OF ABDOMEN: CPT | Mod: 26

## 2023-04-02 PROCEDURE — 96374 THER/PROPH/DIAG INJ IV PUSH: CPT

## 2023-04-02 RX ORDER — SODIUM CHLORIDE 9 MG/ML
1000 INJECTION INTRAMUSCULAR; INTRAVENOUS; SUBCUTANEOUS ONCE
Refills: 0 | Status: COMPLETED | OUTPATIENT
Start: 2023-04-02 | End: 2023-04-02

## 2023-04-02 RX ORDER — FAMOTIDINE 10 MG/ML
20 INJECTION INTRAVENOUS ONCE
Refills: 0 | Status: COMPLETED | OUTPATIENT
Start: 2023-04-02 | End: 2023-04-02

## 2023-04-02 RX ORDER — MORPHINE SULFATE 50 MG/1
4 CAPSULE, EXTENDED RELEASE ORAL ONCE
Refills: 0 | Status: DISCONTINUED | OUTPATIENT
Start: 2023-04-02 | End: 2023-04-02

## 2023-04-02 RX ADMIN — SODIUM CHLORIDE 1000 MILLILITER(S): 9 INJECTION INTRAMUSCULAR; INTRAVENOUS; SUBCUTANEOUS at 10:17

## 2023-04-02 RX ADMIN — FAMOTIDINE 20 MILLIGRAM(S): 10 INJECTION INTRAVENOUS at 08:35

## 2023-04-02 RX ADMIN — SODIUM CHLORIDE 1000 MILLILITER(S): 9 INJECTION INTRAMUSCULAR; INTRAVENOUS; SUBCUTANEOUS at 08:35

## 2023-04-02 RX ADMIN — MORPHINE SULFATE 4 MILLIGRAM(S): 50 CAPSULE, EXTENDED RELEASE ORAL at 09:52

## 2023-04-02 RX ADMIN — MORPHINE SULFATE 4 MILLIGRAM(S): 50 CAPSULE, EXTENDED RELEASE ORAL at 08:35

## 2023-04-02 NOTE — CONSULT NOTE ADULT - SUBJECTIVE AND OBJECTIVE BOX
GENERAL SURGERY CONSULT NOTE    Patient: FLORENCE VIGIL , 48y ([birth year])Male   MRN: [id number]  Location: [facility] ED  Visit: 04-02-23 Emergency  Date: 04-02-23 @ 13:51    HPI: Patient is a 48 year old male with PMHx of GERD and biliary colic who presents to the ED complaining of epigastric and RUQ abdominal pain since 1 AM after having BBQ food for dinner last night. Patient reports that he has had this happen to him in the past, most recently just last week. Patient follows with a surgeon, Dr. Reese, in [place] who is in the process of scheduling patient for outpatient cholecystectomy. Patient was also seen in this ED in 2020 with same complaint and diagnosis of biliary colic. No fevers, chills, nausea or vomiting. Patient reports pain has improved while here.       PAST MEDICAL & SURGICAL HISTORY:  Chronic GERD      No significant past surgical history          Home Medications:  pantoprazole 40 mg oral delayed release tablet: 1 tab(s) orally once a day (24 Feb 2022 14:14)        VITALS:  T(F): 99.2 (04-02-23 @ 06:43), Max: 99.2 (04-02-23 @ 06:43)  HR: 75 (04-02-23 @ 06:43) (75 - 75)  BP: 158/76 (04-02-23 @ 06:43) (158/76 - 158/76)  RR: 18 (04-02-23 @ 06:43) (18 - 18)  SpO2: 100% (04-02-23 @ 06:43) (100% - 100%)    PHYSICAL EXAM:  General: NAD, AAOx3, calm and cooperative  HEENT: NCAT, DEJON, EOMI  Cardiac: RRR S1, S2  Respiratory: CTAB, normal respiratory effort, breath sounds equal BL  Abdomen: Soft, non-distended, minimally tender RUQ      MEDICATIONS  (STANDING):    MEDICATIONS  (PRN):      LAB/STUDIES:                        14.8   8.41  )-----------( 221      ( 02 Apr 2023 08:45 )             43.4     04-02    137  |  99  |  19  ----------------------------<  142<H>  3.6   |  22  |  0.8    Ca    9.6      02 Apr 2023 08:45    TPro  7.6  /  Alb  4.7  /  TBili  0.6  /  DBili  x   /  AST  22  /  ALT  36  /  AlkPhos  112  04-02      LIVER FUNCTIONS - ( 02 Apr 2023 08:45 )  Alb: 4.7 g/dL / Pro: 7.6 g/dL / ALK PHOS: 112 U/L / ALT: 36 U/L / AST: 22 U/L / GGT: x             CARDIAC MARKERS ( 02 Apr 2023 08:45 )  x     / <0.01 ng/mL / x     / x     / x              IMAGING:  < from: US Abdomen Upper Quadrant Right (04.02.23 @ 08:56) >  IMPRESSION:    1. Contracted gallbladder with cholelithiasis; no definite sonographic   evidence of acute cholecystitis.  2. Diffuse hepatic steatosis.        --- End of Report ---    < end of copied text >        ACCESS DEVICES:  [ ] Peripheral IV  [ ] Central Venous Line	[ ] R	[ ] L	[ ] IJ	[ ] Fem	[ ] SC	Placed:   [ ] Arterial Line		[ ] R	[ ] L	[ ] Fem	[ ] Rad	[ ] Ax	Placed:   [ ] PICC:					[ ] Mediport  [ ] Urinary Catheter, Date Placed:      GENERAL SURGERY CONSULT NOTE    Patient: FLORENCE VIGIL , 48y ([birth year])Male   MRN: [id number]  Location: [facility] ED  Visit: 04-02-23 Emergency  Date: 04-02-23 @ 13:51    HPI: Patient is a 48 year old male with PMHx of GERD and biliary colic who presents to the ED complaining of epigastric and RUQ abdominal pain since 1 AM after having BBQ food for dinner last night. Patient reports that he has had this happen to him in the past, most recently just last week. Patient follows with a surgeon, Dr. Reese, in [place] who is in the process of scheduling patient for outpatient cholecystectomy. Patient was also seen in this ED in 2020 with same complaint and diagnosis of biliary colic. No fevers, chills, nausea or vomiting. Patient reports pain has improved while here.       PAST MEDICAL & SURGICAL HISTORY:  Chronic GERD      No significant past surgical history          Home Medications:  pantoprazole 40 mg oral delayed release tablet: 1 tab(s) orally once a day (24 Feb 2022 14:14)        VITALS:  T(F): 99.2 (04-02-23 @ 06:43), Max: 99.2 (04-02-23 @ 06:43)  HR: 75 (04-02-23 @ 06:43) (75 - 75)  BP: 158/76 (04-02-23 @ 06:43) (158/76 - 158/76)  RR: 18 (04-02-23 @ 06:43) (18 - 18)  SpO2: 100% (04-02-23 @ 06:43) (100% - 100%)    PHYSICAL EXAM:  General: NAD, AAOx3, calm and cooperative  HEENT: NCAT, DEJON, EOMI  Cardiac: RRR S1, S2  Respiratory: CTAB, normal respiratory effort, breath sounds equal BL  Abdomen: Soft, non-distended, minimally tender RUQ      MEDICATIONS  (STANDING):    MEDICATIONS  (PRN):      LAB/STUDIES:                        14.8   8.41  )-----------( 221      ( 02 Apr 2023 08:45 )             43.4     04-02    137  |  99  |  19  ----------------------------<  142<H>  3.6   |  22  |  0.8    Ca    9.6      02 Apr 2023 08:45    TPro  7.6  /  Alb  4.7  /  TBili  0.6  /  DBili  x   /  AST  22  /  ALT  36  /  AlkPhos  112  04-02      LIVER FUNCTIONS - ( 02 Apr 2023 08:45 )  Alb: 4.7 g/dL / Pro: 7.6 g/dL / ALK PHOS: 112 U/L / ALT: 36 U/L / AST: 22 U/L / GGT: x             CARDIAC MARKERS ( 02 Apr 2023 08:45 )  x     / <0.01 ng/mL / x     / x     / x              IMAGING:  < from: US Abdomen Upper Quadrant Right (04.02.23 @ 08:56) >  IMPRESSION:    1. Contracted gallbladder with cholelithiasis; no definite sonographic   evidence of acute cholecystitis.  2. Diffuse hepatic steatosis.        --- End of Report ---    < end of copied text >

## 2023-04-02 NOTE — ED PROVIDER NOTE - PATIENT PORTAL LINK FT
You can access the FollowMyHealth Patient Portal offered by [facility] by registering at the following website: [email or web address]. By joining [facility]’s FollowMyHealth portal, you will also be able to view your health information using other applications (apps) compatible with our system.

## 2023-04-02 NOTE — ED PROVIDER NOTE - NS_EDPROVIDERDISPOUSERTYPE_ED_A_ED
History  Chief Complaint   Patient presents with   • Shortness of Breath     Patient reports shortness of breath over the past few days  Increased NC o2 to 4L  Patient also reports chest pain that has been intermittent for two days      HPI    Is a very pleasant, ill-appearing, 56-year-old white male with a history of COPD and recent pneumothorax: iatrogenic secondary to intercoastal nerve block on the right status post chest tube insertion and discharged on 23 February 2023 presents with a 3-day history of cough, congestion, shortness of breath  Patient normally wears 2 L of nasal cannula oxygen, increased to 4 L within the last 24 hours  Patient also has relevant past medical history of stage III chronic kidney disease with a creatinine baseline of 1 12 as of 21 February 2023, obstructive sleep apnea on CPAP at home, prediabetes with a hemoglobin A1c of 5 9, GERD, chronic ischemic heart disease  2D ECHO: 09/29/2022: 45% EF, mild AS  Prior to Admission Medications   Prescriptions Last Dose Informant Patient Reported? Taking? Blood Pressure Monitor KIT   No No   Sig: Use daily   Patient taking differently: Use 40 mg daily   Fluticasone-Salmeterol (Wixela Inhub) 500-50 mcg/dose inhaler   No No   Sig: Inhale 1 puff 2 (two) times a day Rinse mouth after use     Tiotropium Bromide Monohydrate (SPIRIVA RESPIMAT IN)   Yes No   Sig: Inhale every morning   albuterol (2 5 mg/3 mL) 0 083 % nebulizer solution   No No   Sig: Take 3 mL (2 5 mg total) by nebulization every 6 (six) hours as needed for wheezing or shortness of breath   aspirin 81 mg chewable tablet   Yes No   Sig: CHEW ONE TABLET BY MOUTH EVERY DAY   azithromycin (ZITHROMAX) 500 MG tablet   No No   Sig: Take 1 tablet (500 mg total) by mouth 3 (three) times a week   dextromethorphan-guaiFENesin (ROBITUSSIN DM)  mg/5 mL syrup   No No   Sig: Take 5 mL by mouth 3 (three) times a day as needed for cough   famotidine (PEPCID) 20 mg tablet   No No   Sig: Take 1 tablet (20 mg total) by mouth daily at bedtime   fluticasone (FLONASE) 50 mcg/act nasal spray   Yes No   Sig: into each nostril as needed    furosemide (LASIX) 40 mg tablet   No No   Sig: Take 1 tablet (40 mg total) by mouth daily   gabapentin (NEURONTIN) 300 mg capsule   No No   Sig: Take 1 capsule (300 mg total) by mouth 2 (two) times a day   metoprolol succinate (TOPROL-XL) 25 mg 24 hr tablet   No No   Sig: Take 0 5 tablets (12 5 mg total) by mouth 2 (two) times a day   oxygen gas   Yes No   Sig: Inhale 2 L/min continuous   2LPM at rest and 3LPM with activity per D Cedric FANG notes  Indications: SOB, pulmonary edema suspected   potassium chloride (Klor-Con) 10 mEq tablet   No No   Sig: Take 2 tablets (20 mEq total) by mouth daily   rosuvastatin (CRESTOR) 40 MG tablet   No No   Sig: TAKE 1 TABLET DAILY   Patient taking differently: Take 40 mg by mouth daily at bedtime      Facility-Administered Medications: None       Past Medical History:   Diagnosis Date   • Bilateral carotid artery stenosis    • Cancer (HCC)     skin   • Chronic diastolic heart failure (HCC)    • Chronic ischemic heart disease    • Chronic kidney disease     stage 3   • Colon polyp    • COPD (chronic obstructive pulmonary disease) (HCC)    • Coronary artery disease     hx stents, MI, PCI   • COVID 11/2021   • CPAP (continuous positive airway pressure) dependence    • Emphysema of lung ([facility] [address] ) 1995    started   • Hearing loss    • History of transfusion 1995   • Hyperlipidemia    • Hypertension    • Lung nodule 2023    x rays   • MI (myocardial infarction) ([facility] [address] ) 1995   • Myocardial infarction ([facility] [address] ) 1995   • Pneumonia    • Pneumothorax feb 20,2023    collapsed lung   • Prostate cancer ([facility] [address] )    • RSV (respiratory syncytial virus infection) 10/2022   • S/P carotid endarterectomy    • Shortness of breath     O2 2 l/nc PRN   • Sleep apnea    • Sleep apnea, obstructive    • Stented coronary artery        Past Surgical History:   Procedure Laterality Date   • APPENDECTOMY     • CARDIAC CATHETERIZATION  2021    left main with no significant disease, proximal LAD with 10% stenosis at the site of prior stent, left circumflex artery with minimal luminal irregularities mid RCA with 50% stenosis at site of prior stent and PL segment with distal disease supplied by collaterals from the distal circumflex with no significant CAD requiring revascularization at that time  • CATARACT EXTRACTION, BILATERAL Bilateral    • COLONOSCOPY     • CORONARY ANGIOPLASTY WITH STENT PLACEMENT      RCA   • CORONARY ANGIOPLASTY WITH STENT PLACEMENT      RCA   • CORONARY ANGIOPLASTY WITH STENT PLACEMENT      LAD   • EYE SURGERY     • ME BX PROSTATE STRTCTC SATURATION SAMPLING IMG GID N/A 2022    Procedure: TRANSPERINEAL MRI FUSION  BIOPSY PROSTATE;  Surgeon: Zak Gutierrez MD;  Location: [facility];  Service: Urology   • ME NEUROPLASTY &/TRANSPOS MEDIAN NRV Lewayne Bonnet Left 2022    Procedure: RELEASE CARPAL TUNNEL;  Surgeon: Megha Thomson MD;  Location: [facility];  Service: Orthopedics   • ME NEUROPLASTY &/TRANSPOSITION ULNAR NERVE ELBOW Left 2022    Procedure: RELEASE CUBITAL TUNNEL;  Surgeon: Megha Thomson MD;  Location: [facility];  Service: Orthopedics   • ME NEUROPLASTY &/TRANSPOSITION ULNAR NERVE WRIST Left 2022    Procedure: Carnella East;  Surgeon: Megha Thomson MD;  Location: [facility];  Service: Orthopedics   • SKIN CANCER EXCISION      chin-per pt, basal cell  also right ankle   • TONSILLECTOMY  no       Family History   Problem Relation Age of Onset   • Heart attack Mother    • Dementia Mother    • Heart failure Mother             • No Known Problems Father      I have reviewed and agree with the history as documented      E-Cigarette/Vaping   • E-Cigarette Use Never User      E-Cigarette/Vaping Substances   • Nicotine No    • THC No    • CBD No    • Flavoring No    • Other No    • Unknown No Social History     Tobacco Use   • Smoking status: Former     Packs/day: 2 00     Years: 35 00     Pack years: 70 00     Types: Cigarettes     Start date: 1960     Quit date: 1995     Years since quittin 7   • Smokeless tobacco: Never   • Tobacco comments:     stopped    Vaping Use   • Vaping Use: Never used   Substance Use Topics   • Alcohol use: Never   • Drug use: Never       Review of Systems   Constitutional: Negative  HENT: Negative  Eyes: Negative  Respiratory: Positive for cough, chest tightness and shortness of breath  Negative for choking, wheezing and stridor  Cardiovascular: Negative  Negative for chest pain and palpitations  Gastrointestinal: Negative  Negative for abdominal pain and diarrhea  Endocrine: Negative  Genitourinary: Negative  Musculoskeletal: Negative  Skin: Negative  Allergic/Immunologic: Negative  Neurological: Negative  Hematological: Negative  Psychiatric/Behavioral: Negative  Physical Exam  Physical Exam  Vitals and nursing note reviewed  Constitutional:       General: He is in acute distress  Appearance: He is well-developed and normal weight  HENT:      Head: Normocephalic and atraumatic  Mouth/Throat:      Mouth: Mucous membranes are moist    Eyes:      Pupils: Pupils are equal, round, and reactive to light  Cardiovascular:      Rate and Rhythm: Tachycardia present  Rhythm irregular  Pulmonary:      Effort: Pulmonary effort is normal  No tachypnea  Breath sounds: Examination of the right-lower field reveals rhonchi  Examination of the left-lower field reveals rhonchi  Rhonchi present  Chest:      Chest wall: No mass  Abdominal:      Palpations: Abdomen is soft  Musculoskeletal:         General: Normal range of motion  Cervical back: Normal range of motion and neck supple  Right lower leg: No tenderness  No edema  Left lower leg: No tenderness  No edema     Skin:     General: Skin is warm  Neurological:      General: No focal deficit present  Mental Status: He is alert     Psychiatric:         Mood and Affect: Mood normal          Vital Signs  ED Triage Vitals   Temperature Pulse Respirations Blood Pressure SpO2   03/15/23 1631 03/15/23 1631 03/15/23 1631 03/15/23 1631 03/15/23 1631   (!) 101 2 °F (38 4 °C) (!) 144 (!) 26 145/75 98 %      Temp Source Heart Rate Source Patient Position - Orthostatic VS BP Location FiO2 (%)   03/15/23 1631 03/15/23 1631 03/15/23 1715 03/15/23 1715 --   Tympanic Monitor Sitting Right arm       Pain Score       03/15/23 1631       1           Vitals:    03/15/23 1631 03/15/23 1715 03/15/23 1730 03/15/23 1800   BP: 145/75 104/54 91/55 93/53   Pulse: (!) 144 (!) 135 (!) 126 (!) 114   Patient Position - Orthostatic VS:  Sitting Sitting Sitting         Visual Acuity      ED Medications  Medications   albuterol inhalation solution 2 5 mg (has no administration in time range)   aspirin chewable tablet 81 mg (has no administration in time range)   famotidine (PEPCID) tablet 20 mg (has no administration in time range)   gabapentin (NEURONTIN) capsule 300 mg (0 mg Oral Hold 3/15/23 1806)   potassium chloride (K-DUR,KLOR-CON) CR tablet 20 mEq (has no administration in time range)   atorvastatin (LIPITOR) tablet 80 mg (has no administration in time range)   sodium chloride 0 9 % infusion (has no administration in time range)   acetaminophen (TYLENOL) tablet 650 mg (has no administration in time range)   ondansetron (ZOFRAN) injection 4 mg (has no administration in time range)   guaiFENesin (MUCINEX) 12 hr tablet 600 mg (has no administration in time range)   enoxaparin (LOVENOX) subcutaneous injection 40 mg (has no administration in time range)   cefepime (MAXIPIME) IVPB (premix in dextrose) 2,000 mg 50 mL (has no administration in time range)   NON FORMULARY (has no administration in time range)   NON FORMULARY (has no administration in time range)   vancomycin (VANCOCIN) 1500 mg in sodium chloride 0 9% 250 mL IVPB (has no administration in time range)   levalbuterol (XOPENEX) inhalation solution 1 25 mg (has no administration in time range)   cefepime (MAXIPIME) IVPB (premix in dextrose) 2,000 mg 50 mL (0 mg Intravenous Stopped 3/15/23 1711)   vancomycin (VANCOCIN) 1500 mg in sodium chloride 0 9% 250 mL IVPB (1,500 mg Intravenous New Bag 3/15/23 1716)   sodium chloride 0 9 % bolus 500 mL (500 mL Intravenous New Bag 3/15/23 1640)   acetaminophen (TYLENOL) tablet 650 mg (650 mg Oral Given 3/15/23 1641)   sodium chloride 0 9 % bolus 1,000 mL (1,000 mL Intravenous New Bag 3/15/23 1720)     Followed by   sodium chloride 0 9 % bolus 1,000 mL ( Intravenous Restarted 3/15/23 1720)     Followed by   sodium chloride 0 9 % bolus 1,000 mL (1,000 mL Intravenous New Bag 3/15/23 1805)       Diagnostic Studies  Results Reviewed     Procedure Component Value Units Date/Time    HS Troponin I 4hr [[id number]]     Lab Status: No result Specimen: Blood     MRSA culture [[id number]]     Lab Status: No result Specimen: Nares from Nose     Lactic acid [[id number]]  (Abnormal) Collected: 03/15/23 1637    Lab Status: Final result Specimen: Blood from Arm, Right Updated: 03/15/23 1733     LACTIC ACID 3 6 mmol/L     Narrative:      Result may be elevated if tourniquet was used during collection      Lactic acid 2 Hours [[id number]]     Lab Status: No result Specimen: Blood     Sputum culture and Gram stain [[id number]]     Lab Status: No result Specimen: Expectorated Sputum     Strep Pneumoniae, Urine [[id number]]     Lab Status: No result Specimen: Urine, Clean Catch     Legionella antigen, Urine [[id number]]     Lab Status: No result Specimen: Urine, Clean Catch     B-Type Natriuretic Peptide(BNP) [[id number]]  (Abnormal) Collected: 03/15/23 1637    Lab Status: Final result Specimen: Blood from Arm, Right Updated: 03/15/23 1728      pg/mL     FLU/RSV/COVID - if FLU/RSV clinically relevant [[id number]]  (Normal) Collected: 03/15/23 1637    Lab Status: Final result Specimen: Nares from Nose Updated: 03/15/23 1726     SARS-CoV-2 Negative     INFLUENZA A PCR Negative     INFLUENZA B PCR Negative     RSV PCR Negative    Narrative:      FOR PEDIATRIC PATIENTS - copy/paste COVID Guidelines URL to browser: [email or web address]    SARS-CoV-2 assay is a Nucleic Acid Amplification assay intended for the  qualitative detection of nucleic acid from SARS-CoV-2 in nasopharyngeal  swabs  Results are for the presumptive identification of SARS-CoV-2 RNA  Positive results are indicative of infection with SARS-CoV-2, the virus  causing COVID-19, but do not rule out bacterial infection or co-infection  with other viruses  Laboratories within the [place] and its  territories are required to report all positive results to the appropriate  public health authorities  Negative results do not preclude SARS-CoV-2  infection and should not be used as the sole basis for treatment or other  patient management decisions  Negative results must be combined with  clinical observations, patient history, and epidemiological information  This test has not been FDA cleared or approved  This test has been authorized by FDA under an Emergency Use Authorization  (EUA)  This test is only authorized for the duration of time the  declaration that circumstances exist justifying the authorization of the  emergency use of an in vitro diagnostic tests for detection of SARS-CoV-2  virus and/or diagnosis of COVID-19 infection under section 564(b)(1) of  the Act, 21 U  S C  758AGO-2(N)(8), unless the authorization is terminated  or revoked sooner  The test has been validated but independent review by FDA  and CLIA is pending  Test performed using [employer] GeneXpert: This RT-PCR assay targets N2,  a region unique to SARS-CoV-2   A conserved region in the E-gene was chosen  for pan-Sarbecovirus detection which includes SARS-CoV-2  According to CMS-2020-01-R, this platform meets the definition of high-throughput technology      Procalcitonin [[id number]]  (Normal) Collected: 03/15/23 1637    Lab Status: Final result Specimen: Blood from Arm, Right Updated: 03/15/23 1724     Procalcitonin 0 17 ng/ml     HS Troponin I 2hr [[id number]]     Lab Status: No result Specimen: Blood     HS Troponin 0hr (reflex protocol) [[id number]]  (Normal) Collected: 03/15/23 1644    Lab Status: Final result Specimen: Blood from Arm, Right Updated: 03/15/23 1721     hs TnI 0hr 27 ng/L     Comprehensive metabolic panel [[id number]]  (Abnormal) Collected: 03/15/23 1637    Lab Status: Final result Specimen: Blood from Arm, Right Updated: 03/15/23 1717     Sodium 140 mmol/L      Potassium 4 0 mmol/L      Chloride 95 mmol/L      CO2 31 mmol/L      ANION GAP 14 mmol/L      BUN 13 mg/dL      Creatinine 1 13 mg/dL      Glucose 124 mg/dL      Calcium 9 7 mg/dL      AST 30 U/L      ALT 31 U/L      Alkaline Phosphatase 75 U/L      Total Protein 7 5 g/dL      Albumin 3 8 g/dL      Total Bilirubin 0 65 mg/dL      eGFR 61 ml/min/1 73sq m     Narrative:      [place] guidelines for Chronic Kidney Disease (CKD):   •  Stage 1 with normal or high GFR (GFR > 90 mL/min/1 73 square meters)  •  Stage 2 Mild CKD (GFR = 60-89 mL/min/1 73 square meters)  •  Stage 3A Moderate CKD (GFR = 45-59 mL/min/1 73 square meters)  •  Stage 3B Moderate CKD (GFR = 30-44 mL/min/1 73 square meters)  •  Stage 4 Severe CKD (GFR = 15-29 mL/min/1 73 square meters)  •  Stage 5 End Stage CKD (GFR <15 mL/min/1 73 square meters)  Note: GFR calculation is accurate only with a steady state creatinine    Protime-INR [[id number]]  (Normal) Collected: 03/15/23 1637    Lab Status: Final result Specimen: Blood from Arm, Right Updated: 03/15/23 1710     Protime 13 5 seconds      INR 1 03    APTT [[id number]]  (Normal) Collected: 03/15/23 6254 Lab Status: Final result Specimen: Blood from Arm, Right Updated: 03/15/23 1710     PTT 28 seconds     CBC and differential [[id number]]  (Abnormal) Collected: 03/15/23 1637    Lab Status: Final result Specimen: Blood from Arm, Right Updated: 03/15/23 1649     WBC 15 85 Thousand/uL      RBC 4 93 Million/uL      Hemoglobin 15 8 g/dL      Hematocrit 50 7 %       fL      MCH 32 0 pg      MCHC 31 2 g/dL      RDW 13 3 %      MPV 10 0 fL      Platelets 750 Thousands/uL      nRBC 0 /100 WBCs      Neutrophils Relative 81 %      Immat GRANS % 0 %      Lymphocytes Relative 6 %      Monocytes Relative 12 %      Eosinophils Relative 1 %      Basophils Relative 0 %      Neutrophils Absolute 12 67 Thousands/µL      Immature Grans Absolute 0 06 Thousand/uL      Lymphocytes Absolute 0 95 Thousands/µL      Monocytes Absolute 1 97 Thousand/µL      Eosinophils Absolute 0 15 Thousand/µL      Basophils Absolute 0 05 Thousands/µL     Blood culture #2 [[id number]] Collected: 03/15/23 1637    Lab Status: In process Specimen: Blood from Arm, Right Updated: 03/15/23 1647    Blood culture #1 [[id number]] Collected: 03/15/23 1637    Lab Status: In process Specimen: Blood from Arm, Left Updated: 03/15/23 1647    UA w Reflex to Microscopic w Reflex to Culture [[id number]]     Lab Status: No result Specimen: Urine                  XR chest 1 view portable   ED Interpretation by Danis Brand III, DO (03/15 1650)   X-ray, portable, right-sided infiltrate                   Procedures  ECG 12 Lead Documentation Only    Date/Time: 3/15/2023 4:53 PM  Performed by: Marleny Mora DO  Authorized by: Danis Brand III, DO     Indications / Diagnosis:  Sob, cough  ECG reviewed by me, the ED Provider: yes    Patient location:  ED  Comments:      I personally reviewed this EKG that was performed the patient March 15, 2023, EKG was completed at 4:53 PM, interpreted by me at the same time, sinus tachycardia with a ventricular rate of 143 bpm, remaining portion intervals within normal limits  No diffuse elevations to indicate pericarditis  No coved ST elevations greater than 2mm with negative T waves in V1-3 to indicate concern for brugada  No biphasic T waves in V2, V3 to indicate Wellens (critical stenosis of LAD)  No elevation in aVR or deviation when compared to V1 (can be associated with ST depression in I,II, V4-6 when left main occlusion is present)  ED Course  ED Course as of 03/15/23 1847   Wed Mar 15, 2023   1633 Patient seen and evaluated  Orders placed  The patient has a heart rate in the 140s, temperature nearly 102, recently admitted to the hospital secondary to a right pneumothorax  1637 Sepsis alert called  1650 Right-sided infiltrate consistent with pneumonia patient has a 15,000 white count, antibiotics ordered previously, no PTX    1706 Case d/w Sue Diaz PA-C, SLIM provider, will admit to Dr Junior Bhardwaj service  Initial Sepsis Screening     Row Name 03/15/23 1633                Is the patient's history suggestive of a new or worsening infection? Yes (Proceed)  -GC        Suspected source of infection pneumonia;urinary tract infection  -GC        Indicate SIRS criteria Tachycardia > 90 bpm;Tachypnea > 20 resp per min;Leukocytosis (WBC > 28845 IJL) OR Leukopenia (WBC <4000 IJL) OR Bandemia (WBC >10% bands)  -GC        Are two or more of the above signs & symptoms of infection both present and new to the patient? Yes (Proceed)  -GC        Assess for evidence of organ dysfunction: Are any of the below criteria present within 6 hours of suspected infection and SIRS criteria that are NOT considered to be chronic conditions? Lactate > 2 0  -GC        Date of presentation of severe sepsis --        Time of presentation of severe sepsis --        Sepsis Note: Click "NEXT" below (NOT "close") to generate sepsis note based on above information   YES (proceed by clicking "NEXT")  -              User Key  (r) = Recorded By, (t) = Taken By, (c) = Cosigned By    [address] Name Provider Type    DREW Ortiz, DO Physician                SBIRT 20yo+    Flowsheet Row Most Recent Value   SBIRT (23 yo +)    In order to provide better care to our patients, we are screening all of our patients for alcohol and drug use  Would it be okay to ask you these screening questions? Yes Filed at: 03/15/2023 1647   Initial Alcohol Screen: US AUDIT-C     1  How often do you have a drink containing alcohol? 0 Filed at: 03/15/2023 1647   2  How many drinks containing alcohol do you have on a typical day you are drinking? 0 Filed at: 03/15/2023 1647   3a  Male UNDER 65: How often do you have five or more drinks on one occasion? 0 Filed at: 03/15/2023 1647   3b  FEMALE Any Age, or MALE 65+: How often do you have 4 or more drinks on one occassion? 0 Filed at: 03/15/2023 1647   Audit-C Score 0 Filed at: 03/15/2023 1647   CRISTOPHER: How many times in the past year have you    Used an illegal drug or used a prescription medication for non-medical reasons? Never Filed at: 03/15/2023 1647                    Medical Decision Making  2 to 3-day history of cough, congestion, increasing oxygen requirement in this COPD patient is 20-year-old gentleman presents to emergency department with generalized weakness and fever  Diagnosed with a pneumonia based on chest x-ray, heart rate in the 140s consistent with a fever of nearly 102, sepsis alert called, IV fluid hydration initiated, there was concern for possible volume overload 500 cc of normal saline administered  After reassessment, no overt signs of volume overload, 30 cc / kg IV initiated, broad spectrum antibiotics ordered  Portions of the record may have been created with voice recognition software  Occasional wrong word or "sound a like" substitutions may have occurred due to the inherent limitations of voice recognition software   Read the chart carefully and recognize, using context, where substitutions have occurred  This patient was examined during the Covid-19 pandemic, and appropriate PPE was employed as defined by OSHA to minimize exposure to the patient and to avoid spread in the event that I am an asymptomatic carrier  All efforts were made to avoid direct contact with the patient per CDC guidelines ("social distancing") unless otherwise necessary to rule out a medical emergency and/or to provide life-saving interventions  Donning and doffing of PPE was performed per recommended guidelines, and personal PPE was employed if /when institutional PPE was not readily available or was deemed to be less than the recommended as defined by OSHA  Fever: acute illness or injury  Leukocytosis: acute illness or injury  Pneumonia: acute illness or injury  Sinus tachycardia: acute illness or injury  Amount and/or Complexity of Data Reviewed  Labs: ordered  Radiology: ordered and independent interpretation performed  Risk  OTC drugs  Prescription drug management  Decision regarding hospitalization            Disposition  Final diagnoses:   Fever   Leukocytosis   Sinus tachycardia   Pneumonia     Time reflects when diagnosis was documented in both MDM as applicable and the Disposition within this note     Time User Action Codes Description Comment    3/15/2023  4:55 PM Kerline Harrell [R50 9] Fever     3/15/2023  4:55 PM Tasia Harrier Add [D72 829] Leukocytosis     3/15/2023  4:56 PM Tasia Harrier Add [R00 0] Sinus tachycardia     3/15/2023  4:56 PM Tasia Harrier Add [J18 9] Pneumonia       ED Disposition     ED Disposition   Admit    Condition   Stable    Date/Time   Wed Mar 15, 2023  5:07 PM    Comment   Case was discussed with Desi Krishnan PA-C  and the patient's admission status was agreed to be Admission Status: inpatient status to the service of Dr Gabby Valerio             Follow-up Information    None         Patient's Medications Discharge Prescriptions    No medications on file       No discharge procedures on file      PDMP Review       Value Time User    PDMP Reviewed  Yes 7/22/2022  8:46 AM Yady Dey PA-C          ED Provider  Electronically Signed by           Garret Marquez III, DO  03/15/23 5799 Attending Attestation (For Attendings USE Only)...

## 2023-04-02 NOTE — CONSULT NOTE ADULT - ATTENDING COMMENTS
ACS Attending Note Attestation    Patient is examined and evaluated at the bedside with the residents/PAs. Treatment plan discussed with the team, nurses, and consulting physicians and consulting teams. Medications, radiological studies and all other relevant studies reviewed. I reviewed the resident/PA note and agreed with above assessment and plan with following additions and corrections.    FLORENCE VIGIL Patient is a 48y old  Male who presents with a chief complaint of RUQ discomfort. Know cholelithiasis presented with biliary colic    Allergies  No Known Allergies  Intolerances    PAST MEDICAL & SURGICAL HISTORY:  Chronic GERD  No significant past surgical history    Vital Signs Last 24 Hrs  T(C): 36.3 (02 Apr 2023 15:28), Max: 37.3 (02 Apr 2023 06:43)  T(F): 97.3 (02 Apr 2023 15:28), Max: 99.2 (02 Apr 2023 06:43)  HR: 84 (02 Apr 2023 15:28) (75 - 84)  BP: 132/83 (02 Apr 2023 15:28) (132/83 - 158/76)  BP(mean): --  RR: 18 (02 Apr 2023 15:28) (18 - 18)  SpO2: 100% (02 Apr 2023 15:28) (100% - 100%)    Parameters below as of 02 Apr 2023 15:28  Patient On (Oxygen Delivery Method): room air                        14.8   8.41  )-----------( 221      ( 02 Apr 2023 08:45 )             43.4     04-02    137  |  99  |  19  ----------------------------<  142<H>  3.6   |  22  |  0.8    Ca    9.6      02 Apr 2023 08:45    TPro  7.6  /  Alb  4.7  /  TBili  0.6  /  DBili  <0.2  /  AST  23  /  ALT  36  /  AlkPhos  105  04-02      Diagnosis: biliary colic    Plan:	  - as per patient request -> will follow up with surgeon of his choice,   - low fat diet   - supportive care  - GI/DVT prophylaxis  - pain management  - follow up consults  - repeat studies as needed    Rina Bell MD, FACS  Trauma/ACS/Surcical Critical care Attending

## 2023-04-02 NOTE — ED PROVIDER NOTE - CLINICAL SUMMARY MEDICAL DECISION MAKING FREE TEXT BOX
48-year-old male history of gallstones plan evaluation of right upper quadrant epigastric pain since last night no fever chills.  Here patient with mild epigastric and quadrant Alexy pain.  Screening labs grossly unremarkable.  Ultrasound demonstrates gallstones without any evidence of cholecystitis.  However patient with persistent pain seen by surgery cleared for discharge outpatient follow-up

## 2023-04-02 NOTE — ED PROVIDER NOTE - PROVIDER TOKENS
PROVIDER:[TOKEN:[[id number]],FOLLOWUP:[1-3 Days]],PROVIDER:[TOKEN:[[id number]],FOLLOWUP:[1-3 Days]]

## 2023-04-02 NOTE — ED PROVIDER NOTE - CARE PROVIDER_API CALL
SHEREE BECERRIL  General Surgery  Phone: [phone]  Fax: ([phone]  Follow Up Time: 1-3 Days    Rina Bell)  Surgery; Surgical Critical Care  [address] [place] [address]  Phone: [phone]  Fax: [phone]  Follow Up Time: 1-3 Days

## 2023-04-02 NOTE — ED ADULT NURSE NOTE - SEPSIS REFERENCE DATA CRITERIA 2
Chief complaint:   Chief Complaint   Patient presents with   • Establish Care       Vitals:  Visit Vitals  /78   Pulse 72   Ht 5' 4\" (1.626 m)   Wt 73.5 kg   BMI 27.81 kg/m²       HISTORY OF PRESENT ILLNESS     HPI  Chief Complaint   Patient presents with   • Establish Care     Kylah was seen today for establish care.    Diagnoses and all orders for this visit:    Prediabetes  -per records  Pt not aware, never on meds    Horseshoe kidney  Seen on past imaging, pt not aware    Screen for colon cancer  No melena, wt loss, change in stool  Pt never had    Abnormal CT scan of 2017 at [facility] showing tree in bud opacity RUL, positive quantiferon, s/p sputum culture x1 positive for MAC, saw pulm, was told no treatment needed, pt asymptomatic  Follows with pulm at [facility]    History of kidney stones s/p ESWL 2016    S/P TKR (total knee replacement), bilateral, right 8/2017, left 11/2017 by dr Jostin Mejia    Pulmonary nodule 7 mm left lung seen 12/2017, pt non smoker  Follows with pulm at [facility]    Other significant problems:  Patient Active Problem List    Diagnosis Date Noted   • Horseshoe kidney 06/13/2018     Priority: Low   • Prediabetes 06/13/2018     Priority: Low   • History of kidney stones s/p ESWL 2016 06/13/2018     Priority: Low   • Abnormal CT scan of lung 06/13/2018     Priority: Low   • S/P TKR (total knee replacement), bilateral, right 8/2017, left 11/2017 by dr Jostin Mejia 06/13/2018     Priority: Low   • h/o Vertigo, now asymptomatic 06/13/2018     Priority: Low   • Pulmonary nodule 7 mm left lung seen 12/2017, pt non smoker 06/13/2018     Priority: Low       PAST MEDICAL, FAMILY AND SOCIAL HISTORY     Medications:  No current outpatient prescriptions on file.     No current facility-administered medications for this visit.        Allergies:  ALLERGIES:  No Known Allergies    Past Medical  History/Surgeries:  Past Medical History:   Diagnosis Date   • Abnormal CT scan of lung    • Chronic  kidney disease    • DJD (degenerative joint disease) of knee    • Horseshoe kidney    • Kidney stone    • Prediabetes        Past Surgical History:   Procedure Laterality Date   • Joint replacement      hemalatha knees   • Pr global eswl kidney         Family History:  Family History   Problem Relation Age of Onset   • Cancer Sister         blood       Social History:  Social History   Substance Use Topics   • Smoking status: Never Smoker   • Smokeless tobacco: Never Used   • Alcohol use Not on file       REVIEW OF SYSTEMS     Review of Systems  Denies cp (chest pain), sob (shortness of breath), dizziness, palpitations, myalgias, nausea, vomiting.  No fever, cough, wt loss  PHYSICAL EXAM     Physical Exam  Physical Exam    Vital Signs:    Visit Vitals  /78   Pulse 72   Ht 5' 4\" (1.626 m)   Wt 73.5 kg   BMI 27.81 kg/m²     Constitutional:  Well groomed, well nourished.  Psychiatric:  Alert and oriented X3.  Good mood/affect.  Eyes:  Pupils equally reactive to light.  Conjunctivae normal.    Cardiovascular:  Normal heart rate.  Normal rhythm.  No murmurs.  No pedal edema.    Respiratory:  Effort normal.  Normal breath sounds.  No respiratory distress.   Skin:  No rash, warm.  No erythema.  Eyes:  EOM (Extraocular movements) are normal.   Neck:  Normal range of motion.  Neck supple.  No thyromegaly present.   Abdominal:  Soft.  Bowel sounds are normal.  There is no hepatosplenomegaly.   Lymphadenopathy:  he has no cervical or supraclavicular adenopathy.   Neurological:  he has normal strength.  No cranial nerve deficit or sensory deficit.     ASSESSMENT/PLAN     Kylah was seen today for establish care.    Diagnoses and all orders for this visit:    Prediabetes  -     GLYCOHEMOGLOBIN; Future  Cant test lipid panel as pt not fasting    Horseshoe kidney  -     COMPREHENSIVE METABOLIC PANEL; Future    Screen for colon cancer  -     OPEN ACCESS COLONOSCOPY    Abnormal CT scan of 2017 at [facility] showing tree in bud opacity  RUL, positive quantiferon, s/p sputum culture x1 positive for MAC, saw pulm, was told no treatment needed, pt asymptomatic  Fu pulm    History of kidney stones s/p ESWL 2016  Asymptomatic    S/P TKR (total knee replacement), bilateral, right 8/2017, left 11/2017 by dr Jostin Mejia  Now asymptomatic    Pulmonary nodule 7 mm left lung seen 12/2017, pt non smoker  Fu pulm and recommendations about rpt imaging    Sign for release of records from pulm and previous pcp    Fu 6 mths     Abnormal Lactate: > 2

## 2023-04-02 NOTE — ED PROVIDER NOTE - DIFFERENTIAL DIAGNOSIS
I called pt and informed him of new orders to take Metoprolol Succinate 12.5 mg daily. Pt verbalized understanding. Rx sent to [facility] in [place].   Biliary colic, cholecystitis, pancreatitis, GERD Differential Diagnosis

## 2023-04-02 NOTE — CONSULT NOTE ADULT - ASSESSMENT
ASSESSMENT:  48yM w/ PMHx of  ***  who presented with ***. Physical exam findings, imaging, and labs as documented above.     PLAN:  -  -  -    Lines/Tubes: PIV, Midline, Central Line, A-Line, Chest tubes, Lawson/RONEY drains, Ch Catheter.    Above plan discussed with Attending Surgeon  ***  , patient, patient family, and Primary team  04-02-23 @ 13:51   ASSESSMENT:   Patient is a 48 year old male with PMHx of GERD and biliary colic who presents to the ED complaining of epigastric and RUQ abdominal pain since 1 AM after having BBQ food for dinner last night. Patient reports that he has had this happen to him in the past, most recently just last week. Patient follows with a surgeon, Dr. Reese, in [place] who is in the process of scheduling patient for outpatient cholecystectomy. Patient was also seen in this ED in 2020 with same complaint and diagnosis of biliary colic. No fevers, chills, nausea or vomiting. Patient reports pain has improved while here. Physical exam findings, imaging, and labs as documented above.  Patient is minimally tender in ED with no objective findings of acute cholecystitis. This is more consistent with Biliary Colic and patient is already following up with a surgeon.  I discussed options with patient such as admission and cholecystectomy at some time this week, vs discharge and f/u with his surgeon for elective cholecystectomy. Patient reports feeling comfortable with discharge at this time and will contact Dr. Reese tomorrow.      PLAN:  - Discharge  - Tylenol and Advil for pain  - Avoid spicy, greasy and fatty foods  - F/U with Dr. Reese. if he is unable to accommodate patient can f/u with Dr. Bell this week for elective cholecystectomy.       Above plan discussed with Attending Surgeon Dr. Bell, patient, and Primary team  04-02-23 @ 13:51

## 2023-04-02 NOTE — ED PROVIDER NOTE - PHYSICAL EXAMINATION
Gen: NAD, AOx3  Head: NCAT  HEENT: PERRL, oral mucosa moist, normal conjunctiva, oropharynx clear without exudate or erythema  Lung: CTAB, no respiratory distress, no wheezing, rales, rhonchi  CV: normal s1/s2, rrr, Normal perfusion, pulses 2+ throughout  Abd: soft, RUQ/epigastric tenderness, ND, no CVA tenderness  Genitourinary: no pelvic tenderness  MSK: No edema, no visible deformities, full range of motion in all 4 extremities  Neuro: CN II-XII grossly intact, No focal neurologic deficits  Skin: No rash   Psych: normal affect

## 2023-04-02 NOTE — ED PROVIDER NOTE - OBJECTIVE STATEMENT
47 yo male with a pmh of gallstones presents c/o RUQ/epigastric abdominal pain that started last night. pt describes the pain as sharp in nature and denies any radiation. pt mentions to have had fatty/greasy foods for dinner. pt denies any other symptoms including fevers, chill, headache, recent illness/travel, cough, chest pain, or SOB.

## 2023-04-02 NOTE — ED PROVIDER NOTE - NSFOLLOWUPINSTRUCTIONS_ED_ALL_ED_FT
Please follow up with your primary care physician within 24-72 hours and return immediately if symptoms worsen.    Our Emergency Department Referral Coordinators will be reaching out to you in the next 24-48 hours from 9:00am to 5:00pm with a follow up appointment. Please expect a phone call from the hospital in that time frame. If you do not receive a call or if you have any questions or concerns, you can reach them at   [phone]    Gallstones    Cholelithiasis (also called gallstones) is a form of gallbladder disease in which stones form in your gallbladder. The gallbladder is an organ that stores bile made in the liver, which helps digest fats. Gallstones begin as small crystals and slowly grow into stones. Gallstone pain occurs when the gallbladder spasms and a gallstone is blocking the duct. Pain can also occur when a stone passes out of the duct. Symptoms may include nausea, vomiting, abdominal pain, or yellowing of the skin. Only take over-the-counter or prescription medicines for pain, discomfort, or fever as directed by your health care provider.   Follow a low-fat diet until seen again by your health care provider.     SEEK IMMEDIATE MEDICAL CARE IF YOU HAVE THE FOLLOWING SYMPTOMS: increased pain not controlled with medication, fever, persistent vomiting, altered mental status.

## 2023-05-26 ENCOUNTER — APPOINTMENT (OUTPATIENT)
Dept: GASTROENTEROLOGY | Facility: CLINIC | Age: 48
End: 2023-05-26

## 2023-06-04 ENCOUNTER — EMERGENCY (EMERGENCY)
Facility: HOSPITAL | Age: 48
LOS: 0 days | Discharge: HOME | End: 2023-06-05
Attending: EMERGENCY MEDICINE
Payer: MEDICAID

## 2023-06-04 VITALS
OXYGEN SATURATION: 98 % | TEMPERATURE: 99 F | WEIGHT: 175.05 LBS | RESPIRATION RATE: 20 BRPM | HEART RATE: 64 BPM | DIASTOLIC BLOOD PRESSURE: 77 MMHG | SYSTOLIC BLOOD PRESSURE: 144 MMHG | HEIGHT: 65 IN

## 2023-06-04 DIAGNOSIS — R51.9 HEADACHE, UNSPECIFIED: ICD-10-CM

## 2023-06-04 DIAGNOSIS — R42 DIZZINESS AND GIDDINESS: ICD-10-CM

## 2023-06-04 PROCEDURE — 70450 CT HEAD/BRAIN W/O DYE: CPT | Mod: MA

## 2023-06-04 PROCEDURE — 99284 EMERGENCY DEPT VISIT MOD MDM: CPT | Mod: 25

## 2023-06-04 PROCEDURE — 83735 ASSAY OF MAGNESIUM: CPT

## 2023-06-04 PROCEDURE — 84484 ASSAY OF TROPONIN QUANT: CPT

## 2023-06-04 PROCEDURE — 70450 CT HEAD/BRAIN W/O DYE: CPT | Mod: 26,MA

## 2023-06-04 PROCEDURE — 99284 EMERGENCY DEPT VISIT MOD MDM: CPT

## 2023-06-04 PROCEDURE — 80053 COMPREHEN METABOLIC PANEL: CPT

## 2023-06-04 PROCEDURE — 36415 COLL VENOUS BLD VENIPUNCTURE: CPT

## 2023-06-04 PROCEDURE — 85025 COMPLETE CBC W/AUTO DIFF WBC: CPT

## 2023-06-04 RX ORDER — MECLIZINE HCL 12.5 MG
50 TABLET ORAL ONCE
Refills: 0 | Status: COMPLETED | OUTPATIENT
Start: 2023-06-04 | End: 2023-06-04

## 2023-06-04 RX ORDER — SODIUM CHLORIDE 9 MG/ML
1000 INJECTION INTRAMUSCULAR; INTRAVENOUS; SUBCUTANEOUS ONCE
Refills: 0 | Status: COMPLETED | OUTPATIENT
Start: 2023-06-04 | End: 2023-06-04

## 2023-06-04 RX ORDER — ACETAMINOPHEN 500 MG
650 TABLET ORAL ONCE
Refills: 0 | Status: COMPLETED | OUTPATIENT
Start: 2023-06-04 | End: 2023-06-04

## 2023-06-04 RX ADMIN — Medication 50 MILLIGRAM(S): at 23:34

## 2023-06-04 RX ADMIN — SODIUM CHLORIDE 1000 MILLILITER(S): 9 INJECTION INTRAMUSCULAR; INTRAVENOUS; SUBCUTANEOUS at 23:34

## 2023-06-04 RX ADMIN — Medication 650 MILLIGRAM(S): at 23:34

## 2023-06-04 NOTE — ED ADULT TRIAGE NOTE - CODE STROKE ACTIVE YN
Discharge Instructions  You may not be sure when your baby is sick and needs to see a doctor, especially if this is your first baby.  DO call your clinic if you are worried about your baby s health.  Most clinics have a 24-hour nurse help line. They are able to answer your questions or reach your doctor 24 hours a day. It is best to call your doctor or clinic instead of the hospital. We are here to help you.    Call 911 if your baby:  - Is limp and floppy  - Has  stiff arms or legs or repeated jerking movements  - Arches his or her back repeatedly  - Has a high-pitched cry  - Has bluish skin  or looks very pale    Call your baby s doctor or go to the emergency room right away if your baby:  - Has a high fever: Rectal temperature of 100.4 degrees F (38 degrees C) or higher or underarm temperature of 99 degree F (37.2 C) or higher.  - Has skin that looks yellow, and the baby seems very sleepy.  - Has an infection (redness, swelling, pain) around the umbilical cord or circumcised penis OR bleeding that does not stop after a few minutes.    Call your baby s clinic if you notice:  - A low rectal temperature of (97.5 degrees F or 36.4 degree C).  - Changes in behavior.  For example, a normally quiet baby is very fussy and irritable all day, or an active baby is very sleepy and limp.  - Vomiting. This is not spitting up after feedings, which is normal, but actually throwing up the contents of the stomach.  - Diarrhea (watery stools) or constipation (hard, dry stools that are difficult to pass).  stools are usually quite soft but should not be watery.  - Blood or mucus in the stools.  - Coughing or breathing changes (fast breathing, forceful breathing, or noisy breathing after you clear mucus from the nose).  - Feeding problems with a lot of spitting up.  - Your baby does not want to feed for more than 6 to 8 hours or has fewer diapers than expected in a 24 hour period.  Refer to the feeding log for expected  number of wet diapers in the first days of life.    If you have any concerns about hurting yourself of the baby, call your doctor right away.      Baby's Birth Weight: 7 lb 12 oz (3515 g)  Baby's Discharge Weight: 3.302 kg (7 lb 4.5 oz)    Recent Labs   Lab Test  17   0250  17   0150  17   0142   ABO   --    --   O   RH   --    --    Pos   GDAT   --    --   Neg   TCBIL   --   6.3*   --    DBIL  0.2   --    --    BILITOTAL  6.0   --    --        Immunization History   Administered Date(s) Administered     Hepatitis B 2017       Hearing Screen Date: 17  Hearing Screen Left Ear Abr (Auditory Brainstem Response): passed  Hearing Screen Right Ear Abr (Auditory Brainstem Response): passed     Umbilical Cord: cord clamp removed  Pulse Oximetry Screen Result: pass  (right arm): 97 %  (foot): 97 %      Car Seat Testing Results:  x  Date and Time of [place] Metabolic Screen: 17   ID Band Number ___[id number]_____  I have checked to make sure that this is my baby.   No

## 2023-06-05 LAB
ALBUMIN SERPL ELPH-MCNC: 4.5 G/DL — SIGNIFICANT CHANGE UP (ref 3.5–5.2)
ALP SERPL-CCNC: 115 U/L — SIGNIFICANT CHANGE UP (ref 30–115)
ALT FLD-CCNC: 34 U/L — SIGNIFICANT CHANGE UP (ref 0–41)
ANION GAP SERPL CALC-SCNC: 11 MMOL/L — SIGNIFICANT CHANGE UP (ref 7–14)
AST SERPL-CCNC: 25 U/L — SIGNIFICANT CHANGE UP (ref 0–41)
BASOPHILS # BLD AUTO: 0.02 K/UL — SIGNIFICANT CHANGE UP (ref 0–0.2)
BASOPHILS NFR BLD AUTO: 0.3 % — SIGNIFICANT CHANGE UP (ref 0–1)
BILIRUB SERPL-MCNC: 0.4 MG/DL — SIGNIFICANT CHANGE UP (ref 0.2–1.2)
BUN SERPL-MCNC: 14 MG/DL — SIGNIFICANT CHANGE UP (ref 10–20)
CALCIUM SERPL-MCNC: 8.9 MG/DL — SIGNIFICANT CHANGE UP (ref 8.4–10.5)
CHLORIDE SERPL-SCNC: 100 MMOL/L — SIGNIFICANT CHANGE UP (ref 98–110)
CO2 SERPL-SCNC: 25 MMOL/L — SIGNIFICANT CHANGE UP (ref 17–32)
CREAT SERPL-MCNC: 0.8 MG/DL — SIGNIFICANT CHANGE UP (ref 0.7–1.5)
EGFR: 108 ML/MIN/1.73M2 — SIGNIFICANT CHANGE UP
EOSINOPHIL # BLD AUTO: 0.08 K/UL — SIGNIFICANT CHANGE UP (ref 0–0.7)
EOSINOPHIL NFR BLD AUTO: 1.3 % — SIGNIFICANT CHANGE UP (ref 0–8)
GLUCOSE SERPL-MCNC: 95 MG/DL — SIGNIFICANT CHANGE UP (ref 70–99)
HCT VFR BLD CALC: 42.1 % — SIGNIFICANT CHANGE UP (ref 42–52)
HGB BLD-MCNC: 14.4 G/DL — SIGNIFICANT CHANGE UP (ref 14–18)
IMM GRANULOCYTES NFR BLD AUTO: 0.3 % — SIGNIFICANT CHANGE UP (ref 0.1–0.3)
LYMPHOCYTES # BLD AUTO: 2.07 K/UL — SIGNIFICANT CHANGE UP (ref 1.2–3.4)
LYMPHOCYTES # BLD AUTO: 34.2 % — SIGNIFICANT CHANGE UP (ref 20.5–51.1)
MAGNESIUM SERPL-MCNC: 2.4 MG/DL — SIGNIFICANT CHANGE UP (ref 1.8–2.4)
MCHC RBC-ENTMCNC: 31.1 PG — HIGH (ref 27–31)
MCHC RBC-ENTMCNC: 34.2 G/DL — SIGNIFICANT CHANGE UP (ref 32–37)
MCV RBC AUTO: 90.9 FL — SIGNIFICANT CHANGE UP (ref 80–94)
MONOCYTES # BLD AUTO: 0.5 K/UL — SIGNIFICANT CHANGE UP (ref 0.1–0.6)
MONOCYTES NFR BLD AUTO: 8.3 % — SIGNIFICANT CHANGE UP (ref 1.7–9.3)
NEUTROPHILS # BLD AUTO: 3.37 K/UL — SIGNIFICANT CHANGE UP (ref 1.4–6.5)
NEUTROPHILS NFR BLD AUTO: 55.6 % — SIGNIFICANT CHANGE UP (ref 42.2–75.2)
NRBC # BLD: 0 /100 WBCS — SIGNIFICANT CHANGE UP (ref 0–0)
PLATELET # BLD AUTO: 217 K/UL — SIGNIFICANT CHANGE UP (ref 130–400)
PMV BLD: 9.7 FL — SIGNIFICANT CHANGE UP (ref 7.4–10.4)
POTASSIUM SERPL-MCNC: 4 MMOL/L — SIGNIFICANT CHANGE UP (ref 3.5–5)
POTASSIUM SERPL-SCNC: 4 MMOL/L — SIGNIFICANT CHANGE UP (ref 3.5–5)
PROT SERPL-MCNC: 7.2 G/DL — SIGNIFICANT CHANGE UP (ref 6–8)
RBC # BLD: 4.63 M/UL — LOW (ref 4.7–6.1)
RBC # FLD: 12.4 % — SIGNIFICANT CHANGE UP (ref 11.5–14.5)
SODIUM SERPL-SCNC: 136 MMOL/L — SIGNIFICANT CHANGE UP (ref 135–146)
TROPONIN T SERPL-MCNC: <0.01 NG/ML — SIGNIFICANT CHANGE UP
WBC # BLD: 6.06 K/UL — SIGNIFICANT CHANGE UP (ref 4.8–10.8)
WBC # FLD AUTO: 6.06 K/UL — SIGNIFICANT CHANGE UP (ref 4.8–10.8)

## 2023-06-05 NOTE — ED PROVIDER NOTE - NSFOLLOWUPCLINICS_GEN_ALL_ED_FT
[facility]  Neurology  [address] [place] [address], [address]  [place] [address]  Phone: [phone]  Fax:

## 2023-06-05 NOTE — ED PROVIDER NOTE - NSFOLLOWUPINSTRUCTIONS_ED_ALL_ED_FT
Headache    A headache is pain or discomfort felt around the head or neck area. The specific cause of a headache may not be found as there are many types including tension headaches, migraine headaches, and cluster headaches. Watch your condition for any changes. Things you can do to manage your pain include taking over the counter and prescription medications as instructed by your health care provider, lying down in a dark quiet room, limiting stress, getting regular sleep, and refraining from alcohol and tobacco products.    SEEK IMMEDIATE MEDICAL CARE IF YOU HAVE ANY OF THE FOLLOWING SYMPTOMS: fever, vomiting, stiff neck, loss of vision, problems with speech, muscle weakness, loss of balance, trouble walking, passing out, or confusion.    Please follow up with your primary care doctor within one week.

## 2023-06-05 NOTE — ED PROVIDER NOTE - PHYSICAL EXAMINATION
CONSTITUTIONAL: Well-appearing; well-nourished; in no apparent distress.   EYES: PERRL; EOM intact.   NECK: Supple; non-tender; no cervical lymphadenopathy.   CARDIOVASCULAR: Normal S1, S2; no murmurs, rubs, or gallops.   RESPIRATORY: Normal chest excursion with respiration; breath sounds clear and equal bilaterally; no wheezes, rhonchi, or rales.  GI/: Normal bowel sounds; non-distended; non-tender; no palpable organomegaly.   MS: No evidence of trauma or deformity. Normal ROM in all four extremities; non-tender to palpation; distal pulses are normal.   SKIN: Normal for age and race; warm; dry; good turgor; no apparent lesions or exudate.   NEURO/PSYCH: A & O x 4; grossly unremarkable. mood and manner are appropriate. No focal deficits. No facial droop. No to tongue deviation. Cerebellar intact. Normal gait. Sensation intact

## 2023-06-05 NOTE — ED PROVIDER NOTE - CLINICAL SUMMARY MEDICAL DECISION MAKING FREE TEXT BOX
Labs and EKG were ordered and reviewed.  Imaging was ordered and reviewed by me.  Appropriate medications for patient's presenting complaints were ordered and effects were reassessed.  Patient's records (prior hospital, ED visit, and/or nursing home notes if available) were reviewed.  Additional history was obtained from EMS, family, and/or PCP (where available).  Escalation to admission/observation was considered.  However, patient is well appearing with normal workup and some improvement in the ED.  Stable for discharge and outpatient follow up.

## 2023-06-05 NOTE — ED ADULT NURSE NOTE - NSFALLUNIVINTERV_ED_ALL_ED
Bed/Stretcher in lowest position, wheels locked, appropriate side rails in place/Call bell, personal items and telephone in reach/Instruct patient to call for assistance before getting out of bed/chair/stretcher/Non-slip footwear applied when patient is off stretcher/[place] to call system/Physically safe environment - no spills, clutter or unnecessary equipment/Purposeful proactive rounding/Room/bathroom lighting operational, light cord in reach

## 2023-06-05 NOTE — ED PROVIDER NOTE - OBJECTIVE STATEMENT
56-year-old male presenting to ER with few weeks of frontal headache and dizziness.  Dizziness is room spinning and is worse with walking and better with rest headache is frontal pressure-like intermittent nonexertional.  Patient was seen by PMD few weeks ago for symptoms and was instructed to see neurologist.  Denies any fever, neck pain or stiffness, visual changes, speech changes, extremity numbness or weakness, unsteady gait, nausea, vomiting, ear pain, chest pain or shortness of breath

## 2023-09-01 NOTE — ED PROVIDER NOTE - PATIENT PORTAL LINK FT
You can access the FollowMyHealth Patient Portal offered by [facility] by registering at the following website: [email or web address]. By joining [employer]’s FollowMyHealth portal, you will also be able to view your health information using other applications (apps) compatible with our system. No

## 2023-09-11 NOTE — ED ADULT NURSE NOTE - NSSEPSISSUSPECTED_ED_A_ED
Kai Sims  September 11, 2023  Plan of Care Hand-off Note     Patient Care Path:  (detox)    Plan for Care:   Pt with history of substance use and Bipolar I Disorder. Today he is intoxicated and seeking detox treatment. He is denying SI/HI/plan/intent; does not present with any signs of immediate threat of harm to self or others. Recommend admission to detox.    Identified Goals and Safety Issues:  none identified    Legal Status: Legal Status at Admission: Voluntary/Patient has signed consent for treatment    Psychiatry Consult: no    Updated MD regarding plan of care.      Yvonne Victor, LICSW        
No

## 2024-01-01 NOTE — ED CDU PROVIDER DISPOSITION NOTE - NS ED ATTENDING STATEMENT MOD
Attending with
- may have an elongated or misshapen head.  The head is shaped according to the birth canal for easier birth.  This is called molding of the head and will round out in a few days.

## 2024-08-01 NOTE — ED ADULT NURSE NOTE - CHIEF COMPLAINT QUOTE
Liza pinedo from [place]mad[facility] requesting a standing PTI order   Fax [phone]    Pt. complains of diffuse abdominal pain. Pt. denies any nausea vomiting or diarrhea. As per pt. symptoms began tonight

## 2024-09-11 ENCOUNTER — EMERGENCY (EMERGENCY)
Facility: HOSPITAL | Age: 49
LOS: 0 days | Discharge: HOME | End: 2024-09-12
Attending: EMERGENCY MEDICINE
Payer: COMMERCIAL

## 2024-09-11 VITALS
TEMPERATURE: 98 F | OXYGEN SATURATION: 99 % | WEIGHT: 179.9 LBS | HEART RATE: 50 BPM | HEIGHT: 65 IN | SYSTOLIC BLOOD PRESSURE: 166 MMHG | DIASTOLIC BLOOD PRESSURE: 100 MMHG | RESPIRATION RATE: 17 BRPM

## 2024-09-11 DIAGNOSIS — R53.1 WEAKNESS: ICD-10-CM

## 2024-09-11 DIAGNOSIS — R10.10 UPPER ABDOMINAL PAIN, UNSPECIFIED: ICD-10-CM

## 2024-09-11 DIAGNOSIS — R53.83 OTHER FATIGUE: ICD-10-CM

## 2024-09-11 LAB
BASOPHILS # BLD AUTO: 0.03 K/UL — SIGNIFICANT CHANGE UP (ref 0–0.2)
BASOPHILS NFR BLD AUTO: 0.7 % — SIGNIFICANT CHANGE UP (ref 0–1)
EOSINOPHIL # BLD AUTO: 0.19 K/UL — SIGNIFICANT CHANGE UP (ref 0–0.7)
EOSINOPHIL NFR BLD AUTO: 4.1 % — SIGNIFICANT CHANGE UP (ref 0–8)
HCT VFR BLD CALC: 41.3 % — LOW (ref 42–52)
HGB BLD-MCNC: 14.5 G/DL — SIGNIFICANT CHANGE UP (ref 14–18)
IMM GRANULOCYTES NFR BLD AUTO: 0.2 % — SIGNIFICANT CHANGE UP (ref 0.1–0.3)
LYMPHOCYTES # BLD AUTO: 2 K/UL — SIGNIFICANT CHANGE UP (ref 1.2–3.4)
LYMPHOCYTES # BLD AUTO: 43.7 % — SIGNIFICANT CHANGE UP (ref 20.5–51.1)
MCHC RBC-ENTMCNC: 31.7 PG — HIGH (ref 27–31)
MCHC RBC-ENTMCNC: 35.1 G/DL — SIGNIFICANT CHANGE UP (ref 32–37)
MCV RBC AUTO: 90.4 FL — SIGNIFICANT CHANGE UP (ref 80–94)
MONOCYTES # BLD AUTO: 0.39 K/UL — SIGNIFICANT CHANGE UP (ref 0.1–0.6)
MONOCYTES NFR BLD AUTO: 8.5 % — SIGNIFICANT CHANGE UP (ref 1.7–9.3)
NEUTROPHILS # BLD AUTO: 1.96 K/UL — SIGNIFICANT CHANGE UP (ref 1.4–6.5)
NEUTROPHILS NFR BLD AUTO: 42.8 % — SIGNIFICANT CHANGE UP (ref 42.2–75.2)
NRBC # BLD: 0 /100 WBCS — SIGNIFICANT CHANGE UP (ref 0–0)
PLATELET # BLD AUTO: 234 K/UL — SIGNIFICANT CHANGE UP (ref 130–400)
PMV BLD: 9.3 FL — SIGNIFICANT CHANGE UP (ref 7.4–10.4)
RBC # BLD: 4.57 M/UL — LOW (ref 4.7–6.1)
RBC # FLD: 12.2 % — SIGNIFICANT CHANGE UP (ref 11.5–14.5)
WBC # BLD: 4.58 K/UL — LOW (ref 4.8–10.8)
WBC # FLD AUTO: 4.58 K/UL — LOW (ref 4.8–10.8)

## 2024-09-11 PROCEDURE — 93005 ELECTROCARDIOGRAM TRACING: CPT

## 2024-09-11 PROCEDURE — 81003 URINALYSIS AUTO W/O SCOPE: CPT

## 2024-09-11 PROCEDURE — 36000 PLACE NEEDLE IN VEIN: CPT | Mod: XU

## 2024-09-11 PROCEDURE — 84439 ASSAY OF FREE THYROXINE: CPT

## 2024-09-11 PROCEDURE — 74177 CT ABD & PELVIS W/CONTRAST: CPT | Mod: 26,MC

## 2024-09-11 PROCEDURE — 99285 EMERGENCY DEPT VISIT HI MDM: CPT | Mod: 25

## 2024-09-11 PROCEDURE — 82962 GLUCOSE BLOOD TEST: CPT

## 2024-09-11 PROCEDURE — 71045 X-RAY EXAM CHEST 1 VIEW: CPT

## 2024-09-11 PROCEDURE — 0241U: CPT

## 2024-09-11 PROCEDURE — 84436 ASSAY OF TOTAL THYROXINE: CPT

## 2024-09-11 PROCEDURE — 36415 COLL VENOUS BLD VENIPUNCTURE: CPT

## 2024-09-11 PROCEDURE — 85025 COMPLETE CBC W/AUTO DIFF WBC: CPT

## 2024-09-11 PROCEDURE — 71045 X-RAY EXAM CHEST 1 VIEW: CPT | Mod: 26

## 2024-09-11 PROCEDURE — 83690 ASSAY OF LIPASE: CPT

## 2024-09-11 PROCEDURE — 99285 EMERGENCY DEPT VISIT HI MDM: CPT

## 2024-09-11 PROCEDURE — 84480 ASSAY TRIIODOTHYRONINE (T3): CPT

## 2024-09-11 PROCEDURE — 74177 CT ABD & PELVIS W/CONTRAST: CPT | Mod: MC

## 2024-09-11 PROCEDURE — 84443 ASSAY THYROID STIM HORMONE: CPT

## 2024-09-11 PROCEDURE — 80053 COMPREHEN METABOLIC PANEL: CPT

## 2024-09-11 RX ADMIN — Medication 1000 MILLILITER(S): at 23:08

## 2024-09-11 NOTE — ED PROVIDER NOTE - PATIENT PORTAL LINK FT
You can access the FollowMyHealth Patient Portal offered by [facility] by registering at the following website: [email or web address]. By joining [employer]’s FollowMyHealth portal, you will also be able to view your health information using other applications (apps) compatible with our system.

## 2024-09-11 NOTE — ED PROVIDER NOTE - PROGRESS NOTE DETAILS
results dw pt, aware TFTs still pending. he has a PMD to f/u will also use abad for pending results.

## 2024-09-11 NOTE — ED PROVIDER NOTE - NSSUBSTANCEUSE_GEN_ALL_CORE_SD
Received request via: Pharmacy    Was the patient seen in the last year in this department? Yes    Does the patient have an active prescription (recently filled or refills available) for medication(s) requested? No  
never used

## 2024-09-11 NOTE — ED PROVIDER NOTE - OBJECTIVE STATEMENT
50 yo m no no pmh, psh of ccy, c/o gen weakness, fatigue for 2 weeks. assoc with some vague, mild upper ab pain. no n, v, d. no black or bloody stools. no fever, chills. no ha, neck pain, cp, sob, leg pain or swelling. no sick contacts. no hx of anemia. 50 yo m no no pmh, psh of ccy, c/o gen weakness, fatigue for 2-3 weeks. assoc with some vague, mild upper ab pain. no n, v, d. no black or bloody stools. no fever, chills. no ha, neck pain, cp, sob, leg pain or swelling. no sick contacts. no hx of anemia.

## 2024-09-11 NOTE — ED PROVIDER NOTE - NSFOLLOWUPINSTRUCTIONS_ED_ALL_ED_FT
Please follow up with your doctor. Keep hydrated.  You have some labs tests that are not available at this time, please use the [email or web address] [facility] and follow up with  your doctor. If symptoms worsen seek medical attention.

## 2024-09-12 LAB
ALBUMIN SERPL ELPH-MCNC: 4.3 G/DL — SIGNIFICANT CHANGE UP (ref 3.5–5.2)
ALP SERPL-CCNC: 109 U/L — SIGNIFICANT CHANGE UP (ref 30–115)
ALT FLD-CCNC: 36 U/L — SIGNIFICANT CHANGE UP (ref 0–41)
ANION GAP SERPL CALC-SCNC: 8 MMOL/L — SIGNIFICANT CHANGE UP (ref 7–14)
APPEARANCE UR: CLEAR — SIGNIFICANT CHANGE UP
AST SERPL-CCNC: 24 U/L — SIGNIFICANT CHANGE UP (ref 0–41)
BILIRUB SERPL-MCNC: 0.6 MG/DL — SIGNIFICANT CHANGE UP (ref 0.2–1.2)
BILIRUB UR-MCNC: NEGATIVE — SIGNIFICANT CHANGE UP
BUN SERPL-MCNC: 15 MG/DL — SIGNIFICANT CHANGE UP (ref 10–20)
CALCIUM SERPL-MCNC: 9.4 MG/DL — SIGNIFICANT CHANGE UP (ref 8.4–10.5)
CHLORIDE SERPL-SCNC: 103 MMOL/L — SIGNIFICANT CHANGE UP (ref 98–110)
CO2 SERPL-SCNC: 26 MMOL/L — SIGNIFICANT CHANGE UP (ref 17–32)
COLOR SPEC: YELLOW — SIGNIFICANT CHANGE UP
CREAT SERPL-MCNC: 0.8 MG/DL — SIGNIFICANT CHANGE UP (ref 0.7–1.5)
DIFF PNL FLD: NEGATIVE — SIGNIFICANT CHANGE UP
EGFR: 107 ML/MIN/1.73M2 — SIGNIFICANT CHANGE UP
FLUAV AG NPH QL: SIGNIFICANT CHANGE UP
FLUBV AG NPH QL: SIGNIFICANT CHANGE UP
GLUCOSE SERPL-MCNC: 105 MG/DL — HIGH (ref 70–99)
GLUCOSE UR QL: NEGATIVE MG/DL — SIGNIFICANT CHANGE UP
KETONES UR-MCNC: NEGATIVE MG/DL — SIGNIFICANT CHANGE UP
LEUKOCYTE ESTERASE UR-ACNC: NEGATIVE — SIGNIFICANT CHANGE UP
LIDOCAIN IGE QN: 67 U/L — HIGH (ref 7–60)
NITRITE UR-MCNC: NEGATIVE — SIGNIFICANT CHANGE UP
PH UR: 6.5 — SIGNIFICANT CHANGE UP (ref 5–8)
POTASSIUM SERPL-MCNC: 4.2 MMOL/L — SIGNIFICANT CHANGE UP (ref 3.5–5)
POTASSIUM SERPL-SCNC: 4.2 MMOL/L — SIGNIFICANT CHANGE UP (ref 3.5–5)
PROT SERPL-MCNC: 7 G/DL — SIGNIFICANT CHANGE UP (ref 6–8)
PROT UR-MCNC: NEGATIVE MG/DL — SIGNIFICANT CHANGE UP
RSV RNA NPH QL NAA+NON-PROBE: SIGNIFICANT CHANGE UP
SARS-COV-2 RNA SPEC QL NAA+PROBE: SIGNIFICANT CHANGE UP
SODIUM SERPL-SCNC: 137 MMOL/L — SIGNIFICANT CHANGE UP (ref 135–146)
SP GR SPEC: >1.03 — HIGH (ref 1–1.03)
T3 SERPL-MCNC: 115 NG/DL — SIGNIFICANT CHANGE UP (ref 80–200)
T4 AB SER-ACNC: 6.9 UG/DL — SIGNIFICANT CHANGE UP (ref 4.6–12)
T4 FREE SERPL-MCNC: 1.2 NG/DL — SIGNIFICANT CHANGE UP (ref 0.9–1.8)
TSH SERPL-MCNC: 6.01 UIU/ML — HIGH (ref 0.27–4.2)
UROBILINOGEN FLD QL: 0.2 MG/DL — SIGNIFICANT CHANGE UP (ref 0.2–1)

## 2024-09-12 PROCEDURE — 93010 ELECTROCARDIOGRAM REPORT: CPT

## 2024-09-12 NOTE — ED ADULT NURSE NOTE - NSFALLUNIVINTERV_ED_ALL_ED
Bed/Stretcher in lowest position, wheels locked, appropriate side rails in place/Call bell, personal items and telephone in reach/Instruct patient to call for assistance before getting out of bed/chair/stretcher/Non-slip footwear applied when patient is off stretcher/[place] to call system/Physically safe environment - no spills, clutter or unnecessary equipment/Purposeful proactive rounding/Room/bathroom lighting operational, light cord in reach

## 2024-12-02 ENCOUNTER — EMERGENCY (EMERGENCY)
Facility: HOSPITAL | Age: 49
LOS: 0 days | Discharge: HOME | End: 2024-12-03
Attending: EMERGENCY MEDICINE
Payer: MEDICAID

## 2024-12-02 VITALS
RESPIRATION RATE: 18 BRPM | OXYGEN SATURATION: 100 % | TEMPERATURE: 98 F | HEART RATE: 59 BPM | SYSTOLIC BLOOD PRESSURE: 143 MMHG | DIASTOLIC BLOOD PRESSURE: 84 MMHG | WEIGHT: 175.05 LBS

## 2024-12-02 DIAGNOSIS — R30.0 DYSURIA: ICD-10-CM

## 2024-12-02 DIAGNOSIS — R35.0 FREQUENCY OF MICTURITION: ICD-10-CM

## 2024-12-02 LAB
APPEARANCE UR: CLEAR — SIGNIFICANT CHANGE UP
BASOPHILS # BLD AUTO: 0.04 K/UL — SIGNIFICANT CHANGE UP (ref 0–0.2)
BASOPHILS NFR BLD AUTO: 0.7 % — SIGNIFICANT CHANGE UP (ref 0–1)
BILIRUB UR-MCNC: NEGATIVE — SIGNIFICANT CHANGE UP
COLOR SPEC: YELLOW — SIGNIFICANT CHANGE UP
DIFF PNL FLD: NEGATIVE — SIGNIFICANT CHANGE UP
EOSINOPHIL # BLD AUTO: 0.14 K/UL — SIGNIFICANT CHANGE UP (ref 0–0.7)
EOSINOPHIL NFR BLD AUTO: 2.4 % — SIGNIFICANT CHANGE UP (ref 0–8)
GLUCOSE UR QL: NEGATIVE MG/DL — SIGNIFICANT CHANGE UP
HCT VFR BLD CALC: 42.6 % — SIGNIFICANT CHANGE UP (ref 42–52)
HGB BLD-MCNC: 14.7 G/DL — SIGNIFICANT CHANGE UP (ref 14–18)
IMM GRANULOCYTES NFR BLD AUTO: 0.2 % — SIGNIFICANT CHANGE UP (ref 0.1–0.3)
KETONES UR-MCNC: NEGATIVE MG/DL — SIGNIFICANT CHANGE UP
LEUKOCYTE ESTERASE UR-ACNC: NEGATIVE — SIGNIFICANT CHANGE UP
LYMPHOCYTES # BLD AUTO: 2.01 K/UL — SIGNIFICANT CHANGE UP (ref 1.2–3.4)
LYMPHOCYTES # BLD AUTO: 34.5 % — SIGNIFICANT CHANGE UP (ref 20.5–51.1)
MCHC RBC-ENTMCNC: 31.4 PG — HIGH (ref 27–31)
MCHC RBC-ENTMCNC: 34.5 G/DL — SIGNIFICANT CHANGE UP (ref 32–37)
MCV RBC AUTO: 91 FL — SIGNIFICANT CHANGE UP (ref 80–94)
MONOCYTES # BLD AUTO: 0.47 K/UL — SIGNIFICANT CHANGE UP (ref 0.1–0.6)
MONOCYTES NFR BLD AUTO: 8.1 % — SIGNIFICANT CHANGE UP (ref 1.7–9.3)
NEUTROPHILS # BLD AUTO: 3.15 K/UL — SIGNIFICANT CHANGE UP (ref 1.4–6.5)
NEUTROPHILS NFR BLD AUTO: 54.1 % — SIGNIFICANT CHANGE UP (ref 42.2–75.2)
NITRITE UR-MCNC: NEGATIVE — SIGNIFICANT CHANGE UP
NRBC # BLD: 0 /100 WBCS — SIGNIFICANT CHANGE UP (ref 0–0)
PH UR: 6.5 — SIGNIFICANT CHANGE UP (ref 5–8)
PLATELET # BLD AUTO: 209 K/UL — SIGNIFICANT CHANGE UP (ref 130–400)
PMV BLD: 9.7 FL — SIGNIFICANT CHANGE UP (ref 7.4–10.4)
PROT UR-MCNC: NEGATIVE MG/DL — SIGNIFICANT CHANGE UP
RBC # BLD: 4.68 M/UL — LOW (ref 4.7–6.1)
RBC # FLD: 12 % — SIGNIFICANT CHANGE UP (ref 11.5–14.5)
SP GR SPEC: 1.01 — SIGNIFICANT CHANGE UP (ref 1–1.03)
UROBILINOGEN FLD QL: 0.2 MG/DL — SIGNIFICANT CHANGE UP (ref 0.2–1)
WBC # BLD: 5.82 K/UL — SIGNIFICANT CHANGE UP (ref 4.8–10.8)
WBC # FLD AUTO: 5.82 K/UL — SIGNIFICANT CHANGE UP (ref 4.8–10.8)

## 2024-12-02 PROCEDURE — 36415 COLL VENOUS BLD VENIPUNCTURE: CPT

## 2024-12-02 PROCEDURE — 96374 THER/PROPH/DIAG INJ IV PUSH: CPT | Mod: XU

## 2024-12-02 PROCEDURE — 99285 EMERGENCY DEPT VISIT HI MDM: CPT | Mod: 25

## 2024-12-02 PROCEDURE — 74177 CT ABD & PELVIS W/CONTRAST: CPT | Mod: MC

## 2024-12-02 PROCEDURE — 83690 ASSAY OF LIPASE: CPT

## 2024-12-02 PROCEDURE — 99284 EMERGENCY DEPT VISIT MOD MDM: CPT | Mod: 25

## 2024-12-02 PROCEDURE — 87086 URINE CULTURE/COLONY COUNT: CPT

## 2024-12-02 PROCEDURE — 87591 N.GONORRHOEAE DNA AMP PROB: CPT

## 2024-12-02 PROCEDURE — 85025 COMPLETE CBC W/AUTO DIFF WBC: CPT

## 2024-12-02 PROCEDURE — 80053 COMPREHEN METABOLIC PANEL: CPT

## 2024-12-02 PROCEDURE — 87491 CHLMYD TRACH DNA AMP PROBE: CPT

## 2024-12-02 PROCEDURE — 81003 URINALYSIS AUTO W/O SCOPE: CPT

## 2024-12-02 RX ORDER — SODIUM CHLORIDE 9 MG/ML
1000 INJECTION, SOLUTION INTRAMUSCULAR; INTRAVENOUS; SUBCUTANEOUS ONCE
Refills: 0 | Status: COMPLETED | OUTPATIENT
Start: 2024-12-02 | End: 2024-12-02

## 2024-12-02 RX ORDER — KETOROLAC TROMETHAMINE 30 MG/ML
30 INJECTION INTRAMUSCULAR; INTRAVENOUS ONCE
Refills: 0 | Status: DISCONTINUED | OUTPATIENT
Start: 2024-12-02 | End: 2024-12-02

## 2024-12-02 RX ADMIN — SODIUM CHLORIDE 1000 MILLILITER(S): 9 INJECTION, SOLUTION INTRAMUSCULAR; INTRAVENOUS; SUBCUTANEOUS at 23:32

## 2024-12-02 RX ADMIN — KETOROLAC TROMETHAMINE 30 MILLIGRAM(S): 30 INJECTION INTRAMUSCULAR; INTRAVENOUS at 23:32

## 2024-12-02 NOTE — ED PROVIDER NOTE - CARE PLAN
Assessment and plan of treatment:	dysuria, flank pain  labs, imaging, supportive care   1 Principal Discharge DX:	Flank pain  Assessment and plan of treatment:	dysuria, flank pain  labs, imaging, supportive care  Secondary Diagnosis:	Dysuria

## 2024-12-02 NOTE — ED PROVIDER NOTE - OBJECTIVE STATEMENT
49-year-old male with past surgical history cholecystectomy denies significant past medical history presents with complaint of urinary frequency and dysuria for 1 week as well as 2 days of right flank pain.  States dysuria is present at end urination.  Denies fever/chills, chest pain, shortness of breath, abdominal pain, nausea/vomiting/diarrhea, change in bowel habits, lightheadedness, dizziness, rash.

## 2024-12-02 NOTE — ED PROVIDER NOTE - PATIENT PORTAL LINK FT
You can access the FollowMyHealth Patient Portal offered by [facility] by registering at the following website: [email or web address]. By joining [email or web address]’s FollowMyHealth portal, you will also be able to view your health information using other applications (apps) compatible with our system.

## 2024-12-02 NOTE — ED ADULT NURSE NOTE - NSFALLUNIVINTERV_ED_ALL_ED
Monitor gait and stability/Monitor for mental status changes and reorient to person, place, and time, as needed/Bed/Stretcher in lowest position, wheels locked, appropriate side rails in place/Call bell, personal items and telephone in reach/Instruct patient to call for assistance before getting out of bed/chair/stretcher/Non-slip footwear applied when patient is off stretcher/[place] to call system/Physically safe environment - no spills, clutter or unnecessary equipment/Purposeful proactive rounding/Room/bathroom lighting operational, light cord in reach

## 2024-12-02 NOTE — ED PROVIDER NOTE - INTERNATIONAL TRAVEL
ASSUMED CARE
PT A&O X3-4; AGGITATED, STATES SHE WANTS HER IV OUT AND "JUST WANTS TO LIVE A
LIFE"; DISTRACTED PT W/ CONVERSATION, OFFERED PT TO SIT IN RECLINER, SHE
REFUSED; VSS; HR 90'S; O2 SATS >93 ON RA; CALL LIGHT IN REACH; BED IN LOWEST
POSITION; BED ALARM ON. No

## 2024-12-02 NOTE — ED PROVIDER NOTE - PHYSICAL EXAMINATION
Vital Signs: I have reviewed the initial vital signs.  Constitutional: appears stated age, no acute distress  Eyes: Sclera clear, EOMI.  Cardiovascular: S1 and S2, regular rate, regular rhythm, well-perfused extremities, radial pulses equal and 2+, pedal pulses 2+ and equal  Respiratory: unlabored respiratory effort, clear to auscultation bilaterally no wheezing, rales, or rhonchi  Gastrointestinal:  abdomen soft, non-tender, + right CVA tenderness  Musculoskeletal: supple neck, no lower extremity edema  Integumentary: warm, dry, no rash  Neurologic: awake, alert, oriented x3, extremities’ motor and sensory functions grossly intact

## 2024-12-02 NOTE — ED PROVIDER NOTE - NSFOLLOWUPINSTRUCTIONS_ED_ALL_ED_FT
Follow-up with your PCP in 1-3 days.    Dolor en la dolor en la fosa lumbar en adultos  Flank Pain, Adult  El dolor en la fosa lumbar es aquel dolor que se localiza en un lado del cuerpo, entre la parte superior del abdomen y columna. Esta área se llama flanco. El dolor puede durar poco tiempo (ever) o puede durar mucho tiempo o ser recurrente (crónico). Puede ser leve o intenso. Puede tener diferentes causas, mary las siguientes:  Dolor o lesión muscular.  Infección renal, cálculos renales o enfermedad renal.  Estrés.  Problemas en la columna vertebral (enfermedad de los discos).  Kim infección pulmonar (neumonía).  Líquido en los pulmones (edema pulmonar).  Kim erupción cutánea causada por el virus de la varicela (culebrilla).  Tumores que afectan la parte posterior del abdomen.  Enfermedad de la vesícula biliar.  Siga estas instrucciones en rod casa:  A comparison of three sample cups showing dark yellow, yellow, and pale yellow urine.  Manjula suficiente líquido mary para mantener la orina de color amarillo pálido.  Karen reposo mary se lo haya indicado el médico.  Use los medicamentos de venta narayan y los recetados solamente mary se lo haya indicado el médico.  Realice un seguimiento por escrito de lo que le provocó el dolor en la fosa lumbar y lo que lo alivió.  Concurra a todas las visitas de seguimiento. [place] es importante.  Comuníquese con un médico si:  El dolor no se lisandra con los medicamentos.  Aparecen nuevos síntomas.  El dolor empeora.  Los síntomas foote más de 2 a 3 días.  Tiene dificultad para orinar u orina con mucha frecuencia.  Solicite ayuda de inmediato si:  Tiene dificultad para respirar o siente que le falta el aire.  Le duele el abdomen, o chana está hinchado o enrojecido.  Tiene náuseas o vómitos.  Siente que podría desmayarse o se desmaya.  Observa du en la orina.  Tiene dolor en la fosa lumbar y fiebre.  Estos síntomas pueden representar un problema grave que constituye kim emergencia. No espere a charlene si los síntomas desaparecen. Solicite atención médica de inmediato. Comuníquese con el servicio de emergencias de rod localidad (911 en los [place]). No conduzca por libra propios medios hasta el hospital.    Resumen  El dolor en la fosa lumbar es aquel dolor que se localiza en un lado del cuerpo, entre la parte superior del abdomen y columna.  El dolor puede durar poco tiempo (ever) o puede durar mucho tiempo o ser recurrente (crónico). Puede ser leve o intenso.  Puede tener diferentes causas.  Comuníquese con ord médico si los síntomas empeoran o si foote más de 2 a 3 mery.      Disuria  Dysuria  La disuria es el dolor o las molestias al orinar. El dolor puede sentirse en la uretra, que es la parte del cuerpo que drena la orina de la vejiga. El dolor también puede sentirse cerca de los genitales, en la nadir, o en la parte inferior del vientre o la espalda.    Es posible que deba orinar con frecuencia o que tenga la sensación repentina de que necesita orinar. Esta afección puede presentarse en los hombres, jean-pierre es más frecuente en las mujeres. Las causas pueden ser:  Kim infección de las vías urinarias (IU).  Cálculos renales o en la vejiga.  Algunas infecciones de transmisión sexual (ITS).  Deshidratación. [place] ocurre cuando no hay suficiente agua en el cuerpo.  Irritación e hinchazón de la vagina.  El uso de algunos medicamentos.  El uso de algunos jabones o productos con fragancia.  Siga estas instrucciones en rod casa:  Medicamentos    A prescription pill bottle with an example of a pill.  [place] los medicamentos únicamente según las indicaciones.  [place] los antibióticos mary se le indicó. No deje de tomarlos aunque comience a sentirse mejor.  Comida y bebida    Manjula suficiente líquido mary para mantener la orina de color amarillo pálido.  Ciertas bebidas pueden empeorar el dolor. Evite:  Bebidas con cafeína.  Té.  Alcohol. En los hombres, el alcohol puede irritar la próstata.  Instrucciones generales    Controle rod afección para detectar si hay algún cambio, mary cambios de color en la orina.  Orine con frecuencia. No contenga la orina paola mucho tiempo.  Si es juan carlos, límpiese de adelante hacia atrás después de orinar y de defecar. Use cada trozo de papel higiénico kim sravani vez cuando se limpie.  Orine después de tener sexo.  Si le realizaron alguna prueba, usted deberá retirar los resultados. Consulte al médico o pregunte en el departamento donde se realiza la prueba, cuándo estarán listos los resultados.  Comuníquese con un médico si:  Tiene fiebre o escalofríos.  Tiene dolor en la espalda o en los costados del cuerpo.  Vomita o siente ganas de vomitar.  Observa du en la orina.  No orina con la frecuencia habitual.  Se siente muy débil.  Solicite ayuda de inmediato si:  Tiene dolor muy intenso que no se lisandra con medicamentos.  Se siente confundido.  Tiene el latido cardíaco rápido en reposo.

## 2024-12-03 LAB
ALBUMIN SERPL ELPH-MCNC: 4.5 G/DL — SIGNIFICANT CHANGE UP (ref 3.5–5.2)
ALP SERPL-CCNC: 109 U/L — SIGNIFICANT CHANGE UP (ref 30–115)
ALT FLD-CCNC: 28 U/L — SIGNIFICANT CHANGE UP (ref 0–41)
ANION GAP SERPL CALC-SCNC: 9 MMOL/L — SIGNIFICANT CHANGE UP (ref 7–14)
AST SERPL-CCNC: 25 U/L — SIGNIFICANT CHANGE UP (ref 0–41)
BILIRUB SERPL-MCNC: 0.7 MG/DL — SIGNIFICANT CHANGE UP (ref 0.2–1.2)
BUN SERPL-MCNC: 16 MG/DL — SIGNIFICANT CHANGE UP (ref 10–20)
CALCIUM SERPL-MCNC: 9.7 MG/DL — SIGNIFICANT CHANGE UP (ref 8.4–10.5)
CHLORIDE SERPL-SCNC: 104 MMOL/L — SIGNIFICANT CHANGE UP (ref 98–110)
CO2 SERPL-SCNC: 28 MMOL/L — SIGNIFICANT CHANGE UP (ref 17–32)
CREAT SERPL-MCNC: 0.7 MG/DL — SIGNIFICANT CHANGE UP (ref 0.7–1.5)
EGFR: 112 ML/MIN/1.73M2 — SIGNIFICANT CHANGE UP
GLUCOSE SERPL-MCNC: 102 MG/DL — HIGH (ref 70–99)
LIDOCAIN IGE QN: 31 U/L — SIGNIFICANT CHANGE UP (ref 7–60)
POTASSIUM SERPL-MCNC: 4.5 MMOL/L — SIGNIFICANT CHANGE UP (ref 3.5–5)
POTASSIUM SERPL-SCNC: 4.5 MMOL/L — SIGNIFICANT CHANGE UP (ref 3.5–5)
PROT SERPL-MCNC: 7.4 G/DL — SIGNIFICANT CHANGE UP (ref 6–8)
SODIUM SERPL-SCNC: 141 MMOL/L — SIGNIFICANT CHANGE UP (ref 135–146)
SPECIMEN SOURCE: SIGNIFICANT CHANGE UP

## 2024-12-03 PROCEDURE — 74177 CT ABD & PELVIS W/CONTRAST: CPT | Mod: 26,MC

## 2024-12-04 LAB
C TRACH RRNA SPEC QL NAA+PROBE: SIGNIFICANT CHANGE UP
CULTURE RESULTS: SIGNIFICANT CHANGE UP
N GONORRHOEA RRNA SPEC QL NAA+PROBE: SIGNIFICANT CHANGE UP
SPECIMEN SOURCE: SIGNIFICANT CHANGE UP

## 2024-12-11 NOTE — CHART NOTE - NSCHARTNOTEFT_GEN_A_CORE
[id number] Sent to [facility] chat 12/3 - DEE / Appointment made - DEE / [address] / 	Wed 01/29/2025 09:00 AM"    SPECIALTY: urology

## 2025-01-07 NOTE — ED ADULT NURSE NOTE - IN THE PAST 12 MONTHS HAVE YOU USED DRUGS OTHER THAN THOSE REQUIRED FOR MEDICAL REASON?
Medication:  metformin (GLUCOPHAGE) 1000 MG tablet  passed protocol.   Last office visit date: 12-26-24  Next appointment scheduled?: Yes 4-9-25  Number of refills given: 3       No

## 2025-01-25 ENCOUNTER — EMERGENCY (EMERGENCY)
Facility: HOSPITAL | Age: 50
LOS: 0 days | Discharge: HOME | End: 2025-01-26
Attending: STUDENT IN AN ORGANIZED HEALTH CARE EDUCATION/TRAINING PROGRAM
Payer: MEDICAID

## 2025-01-25 VITALS
RESPIRATION RATE: 18 BRPM | TEMPERATURE: 97 F | SYSTOLIC BLOOD PRESSURE: 171 MMHG | HEART RATE: 80 BPM | DIASTOLIC BLOOD PRESSURE: 84 MMHG | HEIGHT: 61.02 IN | WEIGHT: 179.9 LBS | OXYGEN SATURATION: 99 %

## 2025-01-25 DIAGNOSIS — Z90.49 ACQUIRED ABSENCE OF OTHER SPECIFIED PARTS OF DIGESTIVE TRACT: ICD-10-CM

## 2025-01-25 DIAGNOSIS — R35.0 FREQUENCY OF MICTURITION: ICD-10-CM

## 2025-01-25 PROCEDURE — 80053 COMPREHEN METABOLIC PANEL: CPT

## 2025-01-25 PROCEDURE — 99284 EMERGENCY DEPT VISIT MOD MDM: CPT | Mod: 25

## 2025-01-25 PROCEDURE — 83690 ASSAY OF LIPASE: CPT

## 2025-01-25 PROCEDURE — 74177 CT ABD & PELVIS W/CONTRAST: CPT | Mod: MC

## 2025-01-25 PROCEDURE — 85025 COMPLETE CBC W/AUTO DIFF WBC: CPT

## 2025-01-25 PROCEDURE — 36000 PLACE NEEDLE IN VEIN: CPT

## 2025-01-25 PROCEDURE — 99285 EMERGENCY DEPT VISIT HI MDM: CPT | Mod: 25

## 2025-01-25 PROCEDURE — 81001 URINALYSIS AUTO W/SCOPE: CPT

## 2025-01-25 PROCEDURE — 36415 COLL VENOUS BLD VENIPUNCTURE: CPT

## 2025-01-26 LAB
ALBUMIN SERPL ELPH-MCNC: 4.4 G/DL — SIGNIFICANT CHANGE UP (ref 3.5–5.2)
ALP SERPL-CCNC: 161 U/L — HIGH (ref 30–115)
ALT FLD-CCNC: 28 U/L — SIGNIFICANT CHANGE UP (ref 0–41)
ANION GAP SERPL CALC-SCNC: 8 MMOL/L — SIGNIFICANT CHANGE UP (ref 7–14)
APPEARANCE UR: CLEAR — SIGNIFICANT CHANGE UP
AST SERPL-CCNC: 19 U/L — SIGNIFICANT CHANGE UP (ref 0–41)
BASOPHILS # BLD AUTO: 0.04 K/UL — SIGNIFICANT CHANGE UP (ref 0–0.2)
BASOPHILS NFR BLD AUTO: 0.7 % — SIGNIFICANT CHANGE UP (ref 0–1)
BILIRUB SERPL-MCNC: 0.4 MG/DL — SIGNIFICANT CHANGE UP (ref 0.2–1.2)
BILIRUB UR-MCNC: NEGATIVE — SIGNIFICANT CHANGE UP
BUN SERPL-MCNC: 15 MG/DL — SIGNIFICANT CHANGE UP (ref 10–20)
CALCIUM SERPL-MCNC: 8.6 MG/DL — SIGNIFICANT CHANGE UP (ref 8.4–10.5)
CHLORIDE SERPL-SCNC: 104 MMOL/L — SIGNIFICANT CHANGE UP (ref 98–110)
CO2 SERPL-SCNC: 26 MMOL/L — SIGNIFICANT CHANGE UP (ref 17–32)
COLOR SPEC: YELLOW — SIGNIFICANT CHANGE UP
CREAT SERPL-MCNC: 0.7 MG/DL — SIGNIFICANT CHANGE UP (ref 0.7–1.5)
DIFF PNL FLD: ABNORMAL
EGFR: 111 ML/MIN/1.73M2 — SIGNIFICANT CHANGE UP
EGFR: 111 ML/MIN/1.73M2 — SIGNIFICANT CHANGE UP
EOSINOPHIL # BLD AUTO: 0.16 K/UL — SIGNIFICANT CHANGE UP (ref 0–0.7)
EOSINOPHIL NFR BLD AUTO: 2.8 % — SIGNIFICANT CHANGE UP (ref 0–8)
GLUCOSE SERPL-MCNC: 126 MG/DL — HIGH (ref 70–99)
GLUCOSE UR QL: NEGATIVE MG/DL — SIGNIFICANT CHANGE UP
HCT VFR BLD CALC: 43.7 % — SIGNIFICANT CHANGE UP (ref 42–52)
HGB BLD-MCNC: 14.7 G/DL — SIGNIFICANT CHANGE UP (ref 14–18)
IMM GRANULOCYTES NFR BLD AUTO: 0.2 % — SIGNIFICANT CHANGE UP (ref 0.1–0.3)
KETONES UR-MCNC: NEGATIVE MG/DL — SIGNIFICANT CHANGE UP
LEUKOCYTE ESTERASE UR-ACNC: NEGATIVE — SIGNIFICANT CHANGE UP
LIDOCAIN IGE QN: 52 U/L — SIGNIFICANT CHANGE UP (ref 7–60)
LYMPHOCYTES # BLD AUTO: 1.89 K/UL — SIGNIFICANT CHANGE UP (ref 1.2–3.4)
LYMPHOCYTES # BLD AUTO: 32.5 % — SIGNIFICANT CHANGE UP (ref 20.5–51.1)
MCHC RBC-ENTMCNC: 31.3 PG — HIGH (ref 27–31)
MCHC RBC-ENTMCNC: 33.6 G/DL — SIGNIFICANT CHANGE UP (ref 32–37)
MCV RBC AUTO: 93.2 FL — SIGNIFICANT CHANGE UP (ref 80–94)
MONOCYTES # BLD AUTO: 0.49 K/UL — SIGNIFICANT CHANGE UP (ref 0.1–0.6)
MONOCYTES NFR BLD AUTO: 8.4 % — SIGNIFICANT CHANGE UP (ref 1.7–9.3)
NEUTROPHILS # BLD AUTO: 3.22 K/UL — SIGNIFICANT CHANGE UP (ref 1.4–6.5)
NEUTROPHILS NFR BLD AUTO: 55.4 % — SIGNIFICANT CHANGE UP (ref 42.2–75.2)
NITRITE UR-MCNC: NEGATIVE — SIGNIFICANT CHANGE UP
NRBC # BLD: 0 /100 WBCS — SIGNIFICANT CHANGE UP (ref 0–0)
NRBC BLD-RTO: 0 /100 WBCS — SIGNIFICANT CHANGE UP (ref 0–0)
PH UR: 6 — SIGNIFICANT CHANGE UP (ref 5–8)
PLATELET # BLD AUTO: 225 K/UL — SIGNIFICANT CHANGE UP (ref 130–400)
PMV BLD: 9.9 FL — SIGNIFICANT CHANGE UP (ref 7.4–10.4)
POTASSIUM SERPL-MCNC: 4 MMOL/L — SIGNIFICANT CHANGE UP (ref 3.5–5)
POTASSIUM SERPL-SCNC: 4 MMOL/L — SIGNIFICANT CHANGE UP (ref 3.5–5)
PROT SERPL-MCNC: 6.6 G/DL — SIGNIFICANT CHANGE UP (ref 6–8)
PROT UR-MCNC: NEGATIVE MG/DL — SIGNIFICANT CHANGE UP
RBC # BLD: 4.69 M/UL — LOW (ref 4.7–6.1)
RBC # FLD: 12.1 % — SIGNIFICANT CHANGE UP (ref 11.5–14.5)
SODIUM SERPL-SCNC: 138 MMOL/L — SIGNIFICANT CHANGE UP (ref 135–146)
SP GR SPEC: 1.02 — SIGNIFICANT CHANGE UP (ref 1–1.03)
UROBILINOGEN FLD QL: 1 MG/DL — SIGNIFICANT CHANGE UP (ref 0.2–1)
WBC # BLD: 5.81 K/UL — SIGNIFICANT CHANGE UP (ref 4.8–10.8)
WBC # FLD AUTO: 5.81 K/UL — SIGNIFICANT CHANGE UP (ref 4.8–10.8)

## 2025-01-26 PROCEDURE — 74177 CT ABD & PELVIS W/CONTRAST: CPT | Mod: 26

## 2025-02-06 ENCOUNTER — APPOINTMENT (OUTPATIENT)
Dept: SURGERY | Facility: CLINIC | Age: 50
End: 2025-02-06

## 2025-02-06 ENCOUNTER — NON-APPOINTMENT (OUTPATIENT)
Age: 50
End: 2025-02-06

## 2025-02-06 VITALS
HEART RATE: 64 BPM | TEMPERATURE: 97.2 F | WEIGHT: 175 LBS | SYSTOLIC BLOOD PRESSURE: 120 MMHG | OXYGEN SATURATION: 98 % | DIASTOLIC BLOOD PRESSURE: 70 MMHG | HEIGHT: 61 IN | BODY MASS INDEX: 33.04 KG/M2

## 2025-02-06 PROCEDURE — 99203 OFFICE O/P NEW LOW 30 MIN: CPT

## 2025-02-12 ENCOUNTER — APPOINTMENT (OUTPATIENT)
Age: 50
End: 2025-02-12

## 2025-02-27 ENCOUNTER — NON-APPOINTMENT (OUTPATIENT)
Age: 50
End: 2025-02-27

## 2025-02-28 PROBLEM — K40.90 INGUINAL HERNIA, LEFT: Status: ACTIVE | Noted: 2025-02-28

## 2025-03-20 ENCOUNTER — OUTPATIENT (OUTPATIENT)
Dept: OUTPATIENT SERVICES | Facility: HOSPITAL | Age: 50
LOS: 1 days | End: 2025-03-20
Payer: MEDICAID

## 2025-03-20 VITALS
RESPIRATION RATE: 16 BRPM | HEART RATE: 67 BPM | WEIGHT: 182.98 LBS | DIASTOLIC BLOOD PRESSURE: 84 MMHG | SYSTOLIC BLOOD PRESSURE: 138 MMHG | OXYGEN SATURATION: 98 % | HEIGHT: 65 IN | TEMPERATURE: 98 F

## 2025-03-20 DIAGNOSIS — K40.90 UNILATERAL INGUINAL HERNIA, WITHOUT OBSTRUCTION OR GANGRENE, NOT SPECIFIED AS RECURRENT: ICD-10-CM

## 2025-03-20 DIAGNOSIS — Z98.890 OTHER SPECIFIED POSTPROCEDURAL STATES: Chronic | ICD-10-CM

## 2025-03-20 LAB
ALBUMIN SERPL ELPH-MCNC: 4.6 G/DL — SIGNIFICANT CHANGE UP (ref 3.5–5.2)
ALP SERPL-CCNC: 119 U/L — HIGH (ref 30–115)
ALT FLD-CCNC: 33 U/L — SIGNIFICANT CHANGE UP (ref 0–41)
ANION GAP SERPL CALC-SCNC: 13 MMOL/L — SIGNIFICANT CHANGE UP (ref 7–14)
APPEARANCE UR: CLEAR — SIGNIFICANT CHANGE UP
AST SERPL-CCNC: 20 U/L — SIGNIFICANT CHANGE UP (ref 0–41)
BACTERIA # UR AUTO: NEGATIVE /HPF — SIGNIFICANT CHANGE UP
BILIRUB UR-MCNC: NEGATIVE — SIGNIFICANT CHANGE UP
BUN SERPL-MCNC: 19 MG/DL — SIGNIFICANT CHANGE UP (ref 10–20)
CALCIUM SERPL-MCNC: 9 MG/DL — SIGNIFICANT CHANGE UP (ref 8.4–10.5)
CHLORIDE SERPL-SCNC: 101 MMOL/L — SIGNIFICANT CHANGE UP (ref 98–110)
CREAT SERPL-MCNC: 0.8 MG/DL — SIGNIFICANT CHANGE UP (ref 0.7–1.5)
DIFF PNL FLD: ABNORMAL
EGFR: 106 ML/MIN/1.73M2 — SIGNIFICANT CHANGE UP
EGFR: 106 ML/MIN/1.73M2 — SIGNIFICANT CHANGE UP
GLUCOSE SERPL-MCNC: 111 MG/DL — HIGH (ref 70–99)
GLUCOSE UR QL: NEGATIVE MG/DL — SIGNIFICANT CHANGE UP
LEUKOCYTE ESTERASE UR-ACNC: NEGATIVE — SIGNIFICANT CHANGE UP
NITRITE UR-MCNC: NEGATIVE — SIGNIFICANT CHANGE UP
PH UR: 6.5 — SIGNIFICANT CHANGE UP (ref 5–8)
POTASSIUM SERPL-MCNC: 4 MMOL/L — SIGNIFICANT CHANGE UP (ref 3.5–5)
POTASSIUM SERPL-SCNC: 4 MMOL/L — SIGNIFICANT CHANGE UP (ref 3.5–5)
PROT SERPL-MCNC: 7.3 G/DL — SIGNIFICANT CHANGE UP (ref 6–8)
PROT UR-MCNC: NEGATIVE MG/DL — SIGNIFICANT CHANGE UP
SODIUM SERPL-SCNC: 139 MMOL/L — SIGNIFICANT CHANGE UP (ref 135–146)
SP GR SPEC: 1.02 — SIGNIFICANT CHANGE UP (ref 1–1.03)
UROBILINOGEN FLD QL: 0.2 MG/DL — SIGNIFICANT CHANGE UP (ref 0.2–1)
WBC UR QL: 0 /HPF — SIGNIFICANT CHANGE UP (ref 0–5)

## 2025-03-20 PROCEDURE — 80053 COMPREHEN METABOLIC PANEL: CPT

## 2025-03-20 PROCEDURE — 36415 COLL VENOUS BLD VENIPUNCTURE: CPT

## 2025-03-20 PROCEDURE — 99214 OFFICE O/P EST MOD 30 MIN: CPT | Mod: 25

## 2025-03-20 PROCEDURE — 81001 URINALYSIS AUTO W/SCOPE: CPT

## 2025-03-20 NOTE — H&P PST ADULT - NSICDXPASTMEDICALHX_GEN_ALL_CORE_FT
PAST MEDICAL HISTORY:  Chronic GERD     H/O inguinal hernia     History of elevated PSA     Obese

## 2025-03-20 NOTE — H&P PST ADULT - HISTORY OF PRESENT ILLNESS
49 yo male presents for PAST in preparation for OPEN LEFT INGUINAL HERNIA REPAIR.    PATIENT/GUARDIAN CURRENTLY DENIES CHEST PAIN  SHORTNESS OF BREATH  PALPITATIONS,  DYSURIA, OR UPPER RESPIRATORY INFECTION IN PAST 2 WEEKS    Anesthesia Alert  NO--Difficult Airway  NO--History of neck surgery or radiation  NO--Limited ROM of neck  NO--History of Malignant hyperthermia  NO--No personal or family history of Pseudocholinesterase deficiency.  NO--Prior Anesthesia Complication  NO--Latex Allergy  NO--Loose teeth  NO--History of Rheumatoid Arthritis  NO--Bleeding risk  NO--DARON  NO--Other_____    PT/GUARDIAN DENIES ANY RASHES, ABRASION, OR OPEN WOUNDS OR CUTS    AS PER THE PT/GUARDIAN, THIS IS HIS/HER COMPLETE MEDICAL AND SURGICAL HX, INCLUDING MEDICATIONS PRESCRIBED AND OVER THE COUNTER  Patient/guardian understands the instructions and was given the opportunity to ask questions and have them answered.  pt/guardian denies any suicidal ideation or thoughts, pt states not a threat to self or others   51 yo male presents for PAST in preparation for OPEN LEFT INGUINAL HERNIA REPAIR.  the pt has presented with worsening symptoms of urgency and frequency at night, seeking medical attention pt went to ED where he was diagnosed with inguinal hernia. Also reports elevated PSA, family history of prostate/bladder cancer    PATIENT/GUARDIAN CURRENTLY DENIES CHEST PAIN  SHORTNESS OF BREATH  PALPITATIONS,  DYSURIA, OR UPPER RESPIRATORY INFECTION IN PAST 2 WEEKS    Anesthesia Alert  NO--Difficult Airway  NO--History of neck surgery or radiation  NO--Limited ROM of neck  NO--History of Malignant hyperthermia  NO--No personal or family history of Pseudocholinesterase deficiency.  NO--Prior Anesthesia Complication  NO--Latex Allergy  NO--Loose teeth  NO--History of Rheumatoid Arthritis  NO--Bleeding risk  NO--DARON  NO--Other_____  DASI 9.89  RCRI 0  PT/GUARDIAN DENIES ANY RASHES, ABRASION, OR OPEN WOUNDS OR CUTS    AS PER THE PT/GUARDIAN, THIS IS HIS/HER COMPLETE MEDICAL AND SURGICAL HX, INCLUDING MEDICATIONS PRESCRIBED AND OVER THE COUNTER  Patient/guardian understands the instructions and was given the opportunity to ask questions and have them answered.  pt/guardian denies any suicidal ideation or thoughts, pt states not a threat to self or others

## 2025-03-20 NOTE — H&P PST ADULT - NSANTHOSAYNRD_GEN_A_CORE
No. DARON screening performed.  STOP BANG Legend: 0-2 = LOW Risk; 3-4 = INTERMEDIATE Risk; 5-8 = HIGH Risk

## 2025-03-20 NOTE — H&P PST ADULT - REASON FOR ADMISSION
Case Type: OP Non-block TimeSuite: OR NorthProceduralist: Kade Arauz  Confirmed Surgery DateTime: 04- - 0:00PAST DateTime: 03- - 16:15Procedure: OPEN LEFT INGUINAL HERNIA REPAIR  ERP?: NoLaterality: LeftLength of Procedure: 90 Minutes  Anesthesia Type: General

## 2025-03-21 DIAGNOSIS — K40.90 UNILATERAL INGUINAL HERNIA, WITHOUT OBSTRUCTION OR GANGRENE, NOT SPECIFIED AS RECURRENT: ICD-10-CM

## 2025-04-01 ENCOUNTER — APPOINTMENT (OUTPATIENT)
Dept: SURGERY | Facility: HOSPITAL | Age: 50
End: 2025-04-01

## 2025-04-01 ENCOUNTER — OUTPATIENT (OUTPATIENT)
Dept: OUTPATIENT SERVICES | Facility: HOSPITAL | Age: 50
LOS: 1 days | Discharge: HOME | End: 2025-04-01
Payer: MEDICAID

## 2025-04-01 ENCOUNTER — TRANSCRIPTION ENCOUNTER (OUTPATIENT)
Age: 50
End: 2025-04-01

## 2025-04-01 VITALS
SYSTOLIC BLOOD PRESSURE: 148 MMHG | DIASTOLIC BLOOD PRESSURE: 82 MMHG | WEIGHT: 179.9 LBS | HEIGHT: 65 IN | RESPIRATION RATE: 17 BRPM | OXYGEN SATURATION: 99 % | HEART RATE: 64 BPM | TEMPERATURE: 98 F

## 2025-04-01 VITALS
SYSTOLIC BLOOD PRESSURE: 128 MMHG | OXYGEN SATURATION: 95 % | DIASTOLIC BLOOD PRESSURE: 81 MMHG | RESPIRATION RATE: 15 BRPM | HEART RATE: 75 BPM

## 2025-04-01 DIAGNOSIS — K40.90 UNILATERAL INGUINAL HERNIA, WITHOUT OBSTRUCTION OR GANGRENE, NOT SPECIFIED AS RECURRENT: ICD-10-CM

## 2025-04-01 DIAGNOSIS — Z98.890 OTHER SPECIFIED POSTPROCEDURAL STATES: Chronic | ICD-10-CM

## 2025-04-01 PROCEDURE — 49505 PRP I/HERN INIT REDUC >5 YR: CPT | Mod: AS,LT

## 2025-04-01 PROCEDURE — C9399: CPT

## 2025-04-01 PROCEDURE — C1781: CPT

## 2025-04-01 PROCEDURE — 49505 PRP I/HERN INIT REDUC >5 YR: CPT | Mod: LT

## 2025-04-01 RX ORDER — SODIUM CHLORIDE 9 G/1000ML
1000 INJECTION, SOLUTION INTRAVENOUS
Refills: 0 | Status: DISCONTINUED | OUTPATIENT
Start: 2025-04-01 | End: 2025-04-01

## 2025-04-01 RX ORDER — ONDANSETRON HCL/PF 4 MG/2 ML
4 VIAL (ML) INJECTION ONCE
Refills: 0 | Status: COMPLETED | OUTPATIENT
Start: 2025-04-01 | End: 2025-04-01

## 2025-04-01 RX ORDER — HYDROMORPHONE/SOD CHLOR,ISO/PF 2 MG/10 ML
0.5 SYRINGE (ML) INJECTION
Refills: 0 | Status: DISCONTINUED | OUTPATIENT
Start: 2025-04-01 | End: 2025-04-01

## 2025-04-01 RX ORDER — ACETAMINOPHEN 500 MG/5ML
2 LIQUID (ML) ORAL
Qty: 32 | Refills: 0
Start: 2025-04-01 | End: 2025-04-04

## 2025-04-01 RX ORDER — ACETAMINOPHEN 500 MG/5ML
1000 LIQUID (ML) ORAL ONCE
Refills: 0 | Status: COMPLETED | OUTPATIENT
Start: 2025-04-01 | End: 2025-04-01

## 2025-04-01 RX ORDER — IBUPROFEN 200 MG
1 TABLET ORAL
Qty: 24 | Refills: 0
Start: 2025-04-01 | End: 2025-04-04

## 2025-04-01 RX ADMIN — Medication 0.5 MILLIGRAM(S): at 17:03

## 2025-04-01 RX ADMIN — Medication 0.5 MILLIGRAM(S): at 17:20

## 2025-04-01 RX ADMIN — Medication 4 MILLIGRAM(S): at 18:24

## 2025-04-01 RX ADMIN — Medication 1000 MILLIGRAM(S): at 14:14

## 2025-04-01 NOTE — ASU DISCHARGE PLAN (ADULT/PEDIATRIC) - FINANCIAL ASSISTANCE
[facility] provides services at a reduced cost to those who are determined to be eligible through [facility]’s financial assistance program. Information regarding [facility]’s financial assistance program can be found by going to [email or web address] or by calling [phone].

## 2025-04-01 NOTE — PRE-ANESTHESIA EVALUATION ADULT - NSANTHOSAYNRD_GEN_A_CORE
No. DARON screening performed.  STOP BANG Legend: 0-2 = LOW Risk; 3-4 = INTERMEDIATE Risk; 5-8 = HIGH Risk
No. DARON screening performed.  STOP BANG Legend: 0-2 = LOW Risk; 3-4 = INTERMEDIATE Risk; 5-8 = HIGH Risk

## 2025-04-01 NOTE — ASU DISCHARGE PLAN (ADULT/PEDIATRIC) - ASU DC SPECIAL INSTRUCTIONSFT
SURGERY DISCHARGE INSTRUCTIONS    FOLLOW-UP - with Dr. Arauz in 1 week. Call the office to make an appointment or if you have any questions/concerns.    DIET - regular, as tolerated.     ACTIVITY- No heavy lifting for 4-6 wks over 10-20 lbs. Walking is encouraged. No running or swimming. No driving while taking pain medication.    WOUNDCARE - *Keep scrotal support on*    Some drainage from your incisions or drain sites is normal. If you have drainage from an open incision or drain site- cover loosely with sterile gauze and tape and change daily. If you have purple glue over your incisions instead, this will come off with time. May shower 24 hours after surgery but no submerging wound under water for 2 weeks (tub bathing). Pat area dry when wet, keep clean and dry. Do not apply powders or lotion to wound area.     PAIN MEDS - Take over the counter extra strength tylenol 650mg and/or ibuprofen 400mg with food every 6 hours for pain control. No more than 4g of tylenol in 24hrs or 1g in 4 hrs. No mixing alcohol with prescription pain meds.     OTHER MEDS - If you have any questions about your other regular home medications please call your primary care physician or the physician who prescribed those medications to you.     If you develop fever, dizziness, chest pain, trouble breathing, nausea, vomiting, increasing abdominal pain, inability to pass bowel movements, redness/pain/discharge from incisions. Please call the office or go to the emergency room immediately.

## 2025-04-01 NOTE — ASU DISCHARGE PLAN (ADULT/PEDIATRIC) - CARE PROVIDER_API CALL
Kade Aaruz  Surgery  [address] [place] [address], Floor [address] [place] [address]  Phone: [phone]  Fax: [phone]  Follow Up Time:

## 2025-04-01 NOTE — CHART NOTE - NSCHARTNOTEFT_GEN_A_CORE
PACU ANESTHESIA ADMISSION NOTE      Procedure:   Post op diagnosis:      ____  Intubated  TV:______       Rate: ______      FiO2: ______    __x__  Patent Airway    __x__  Full return of protective reflexes    __x__  Full recovery from anesthesia / back to baseline     Vitals:   T:  37         R:   20               BP:  124/61                Sat:  98                 P: 76      Mental Status:  __x__ Awake   ___x__ Alert   _____ Drowsy   _____ Sedated    Nausea/Vomiting:  _x___ NO  ______Yes,   See Post - Op Orders          Pain Scale (0-10):  _____    Treatment: __x__ None    ____ See Post - Op/PCA Orders    Post - Operative Fluids:   ____ Oral   ___x_ See Post - Op Orders    Plan: Discharge:   __x__Home       _____Floor     _____Critical Care    _____  Other:_________________    Comments:    Uneventful anesthesia. Patient transported to  spontaneously breathing and hemodynamically stable.

## 2025-04-01 NOTE — BRIEF OPERATIVE NOTE - NSICDXBRIEFPROCEDURE_GEN_ALL_CORE_FT
PROCEDURES:  Open repair of inguinal hernia using mesh in adult 01-Apr-2025 16:52:39  Zunilda Dunn

## 2025-04-08 DIAGNOSIS — K40.90 UNILATERAL INGUINAL HERNIA, WITHOUT OBSTRUCTION OR GANGRENE, NOT SPECIFIED AS RECURRENT: ICD-10-CM

## 2025-04-08 DIAGNOSIS — K21.9 GASTRO-ESOPHAGEAL REFLUX DISEASE WITHOUT ESOPHAGITIS: ICD-10-CM

## 2025-04-08 DIAGNOSIS — E66.9 OBESITY, UNSPECIFIED: ICD-10-CM

## 2025-04-17 ENCOUNTER — APPOINTMENT (OUTPATIENT)
Dept: SURGERY | Facility: CLINIC | Age: 50
End: 2025-04-17
Payer: MEDICAID

## 2025-04-17 VITALS
HEIGHT: 61 IN | HEART RATE: 74 BPM | SYSTOLIC BLOOD PRESSURE: 122 MMHG | WEIGHT: 180 LBS | BODY MASS INDEX: 33.99 KG/M2 | OXYGEN SATURATION: 97 % | DIASTOLIC BLOOD PRESSURE: 80 MMHG

## 2025-04-17 DIAGNOSIS — K40.90 UNILATERAL INGUINAL HERNIA, W/OUT OBSTRUCTION OR GANGRENE, NOT SPECIFIED AS RECURRENT: ICD-10-CM

## 2025-04-17 PROCEDURE — 99024 POSTOP FOLLOW-UP VISIT: CPT

## 2025-04-29 NOTE — ED ADULT NURSE NOTE - HIV OFFER
[FreeTextEntry1] : 1 year old male last seen on 10/29/2024 with recurrent GTC seizures since 1 year old. No fever. First seizure was 2 minutes long, seen at hospital in [facility] and treated with phenobarbital. Second seizure 3 months later, had EEG testing at hospital and meds switched to carbamazepine. After 4 months of treatment, mom stopped the meds on her own. Moved to [place] in September 2023. Had 2 more seizures, the most recent one of longer duration (10 minutes). This was in December 2023. Pt admitted to [facility], had EEG testing and sent home on Keppra 1 mL bid. Dose adjusted in October 2024 to 1.3 mL bid (to maintain a 20 mg/kg/day dose - weight 13 kg). No further seizures since December 2023. Walked at 1 year old. Speaks in small sentences. Good eye contact and receptive skills. Doing well in pre-K. FMH -ve for epilepsy. Birth: FTNSVD no complications. PMH otherwise -ve. NKA. Niuean  used (relative). MRI brain was NL (January 2024). Most recent EEG (12/12/24) was NL.
Previously Declined (within the last year)